# Patient Record
Sex: FEMALE | Race: WHITE | NOT HISPANIC OR LATINO | Employment: FULL TIME | URBAN - METROPOLITAN AREA
[De-identification: names, ages, dates, MRNs, and addresses within clinical notes are randomized per-mention and may not be internally consistent; named-entity substitution may affect disease eponyms.]

---

## 2017-08-08 ENCOUNTER — ALLSCRIPTS OFFICE VISIT (OUTPATIENT)
Dept: OTHER | Facility: OTHER | Age: 37
End: 2017-08-08

## 2018-01-11 NOTE — MISCELLANEOUS
Message   Recorded as Task   Date: 08/09/2016 11:19 AM, Created By: Sharif Whitley   Task Name: Follow Up   Assigned To: Sharif Whitley   Regarding Patient: Mark Hoffmann, Status: Active   Comment: Kimberlyjuventino Gutierrez - 09 Aug 2016 11:19 AM     TASK CREATED  please call norah and let her know her cholesterol was normal but her sugar was still borderline  Her A1C was 5 7% I would just have her watch her diet, sugars, carbs and we will repeat that test in 6 months  Then please fax her physical sheet to the number listed and ask if patient wants the original mailed to her  Thanks  Heena Hinson - 09 Aug 2016 11:58 AM     TASK REPLIED TO: Previously Assigned To Heena Hinson                      pt aware and form faxed        Plan  Encounter for routine gynecological examination    · (Q) THINPREP PAP RFX HR HPV ; every 2 years; Last 17Jok1590; Next 36Ncs7712;  Status:Active  Prediabetes    · (Q) HEMOGLOBIN A1c WITH eAG; Status:Active; Requested ZOF:[id number];     Signatures   Electronically signed by : Nata Grant, [facility];  Aug  9 2016  1:04PM EST                       (Author)

## 2018-01-15 NOTE — PROGRESS NOTES
Assessment    1  Current every day smoker (305 1) (F17 200)   2  Encounter for preventive health examination (V70 0) (Z00 00)    Discussion/Summary  health maintenance visit healthy adult female Currently, she eats a healthy diet and has an adequate exercise regimen  The risks and benefits of immunizations were discussed and immunizations are up to date  Patient discussion: discussed with the patient  Physical - conducted, labs ordered per work will have copied here, quit smoking  PAP due 8/2019    f/u as needed  f/u 1 year  Chief Complaint  Pt presents to the office for her annual PE  PAP due 08/03/18      History of Present Illness  HM, Adult Female: The patient is being seen for a health maintenance evaluation  The last health maintenance visit was 1 year(s) ago  General Health: The patient's health since the last visit is described as good  She has regular dental visits  She denies vision problems  She denies hearing loss  Lifestyle:  She consumes a diverse and healthy diet  She exercises regularly  She uses tobacco  She consumes alcohol  She denies drug use  Reproductive health:  she reports normal menses  Screening:   HPI: Patient here for physical  No complaints  Review of Systems    Constitutional: No fever, no chills, feels well, no tiredness, no recent weight gain or weight loss  Eyes: No complaints of eye pain, no red eyes, no eyesight problems, no discharge, no dry eyes, no itching of eyes  ENT: no complaints of earache, no loss of hearing, no nose bleeds, no nasal discharge, no sore throat, no hoarseness  Cardiovascular: No complaints of slow heart rate, no fast heart rate, no chest pain, no palpitations, no leg claudication, no lower extremity edema  Respiratory: No complaints of shortness of breath, no wheezing, no cough, no SOB on exertion, no orthopnea, no PND     Gastrointestinal: No complaints of abdominal pain, no constipation, no nausea or vomiting, no diarrhea, no bloody stools  Genitourinary: No complaints of dysuria, no incontinence, no pelvic pain, no dysmenorrhea, no vaginal discharge or bleeding  Musculoskeletal: No complaints of arthralgias, no myalgias, no joint swelling or stiffness, no limb pain or swelling  Integumentary: No complaints of skin rash or lesions, no itching, no skin wounds, no breast pain or lump  Neurological: No complaints of headache, no confusion, no convulsions, no numbness, no dizziness or fainting, no tingling, no limb weakness, no difficulty walking  Psychiatric: Not suicidal, no sleep disturbance, no anxiety or depression, no change in personality, no emotional problems  Endocrine: No complaints of proptosis, no hot flashes, no muscle weakness, no deepening of the voice, no feelings of weakness  Hematologic/Lymphatic: No complaints of swollen glands, no swollen glands in the neck, does not bleed easily, does not bruise easily  Active Problems    1  Allergic rhinitis due to pollen (477 0) (J30 1)   2  Current every day smoker (305 1) (F17 200)   3  Encounter for routine gynecological examination (V72 31) (Z01 419)   4  Laboratory examination ordered as part of a routine general medical examination   (V72 62) (Z00 00)   5   Prediabetes (790 29) (R73 09)    Past Medical History    · History of Acute sinusitis (461 9) (J01 90)   · History of Cough (786 2) (R05)   · History of Cough (786 2) (R05)   · History of acute bronchitis (V12 69) (Z87 09)   · History of dysfunctional uterine bleeding (V13 29) (Z87 42)   · History of menorrhagia (V13 29) (Z87 42)   · History of sinusitis (V12 69) (Z87 09)   · History of Nulliparity (V61 8)   · History of Viral upper respiratory infection (465 9) (J06 9,B97 89)    Surgical History    · Denied: History Of Prior Surgery    Family History  Mother    · Denied: Family history of mental disorder   · Denied: Family history of substance abuse   · Family history of Living and Healthy  Father    · Family history of Diabetes Mellitus (V18 0)   · Denied: Family history of mental disorder   · Denied: Family history of substance abuse  Paternal Grandmother    · Family history of Colon cancer  Paternal Grandfather    · Family history of Diabetes Mellitus (V18 0)  Maternal Aunt    · Family history of Breast cancer  Paternal Uncle    · Family history of Diabetes Mellitus (V18 0)    Social History    · Alcohol Use (History)   · 2 beers per wk   · Always uses seat belt   · Caffeine Use   · 1 cup of coffee or tea q other day   · Current every day smoker (305 1) (F17 200)   · Current Smoker (305 1)   · 5-8 cigarettes per day   · Current some day smoker (305 1) (F17 200)   · Smokes 1/2pk of cigarettes a day  · Denied: History of Drug Use   · no   · Has carbon monoxide detectors in home   · Has smoke detectors    Current Meds   1  Fluticasone Propionate 50 MCG/ACT Nasal Suspension; use 2 sprays in each nostril   once daily; Therapy: 43KAC1378 to (Griselda Beady)  Requested for: 67JYG2154; Last   Rx:30Oct2015 Ordered   2  Mibelas 24 Fe 1-20 MG-MCG(24) Oral Tablet Chewable; CHEW TABLET Daily; Therapy: (Recorded:08Aug2017) to Recorded   3  Multiple Vitamins Oral Tablet; Take 1 daily; Therapy: 55DIL0851 to Recorded    Allergies    1  No Known Drug Allergies    2  No Known Environmental Allergies   3  No Known Food Allergies    Vitals   Recorded: 08Aug2017 02:09PM   Heart Rate 84, R Radial   Pulse Quality Normal, R Radial   Respiration Quality Normal   Respiration 14   Systolic 965, RUE, Sitting   Diastolic 68, RUE, Sitting   Height 5 ft 2 5 in   Weight 163 lb 6 4 oz   BMI Calculated 29 41   BSA Calculated 1 76     Physical Exam    Constitutional   General appearance: No acute distress, well appearing and well nourished  Head and Face   Head and face: Normal     Eyes   Conjunctiva and lids: No swelling, erythema or discharge  Pupils and irises: Equal, round, reactive to light      Ears, Nose, Mouth, and Throat External inspection of ears and nose: Normal     Otoscopic examination: Tympanic membranes translucent with normal light reflex  Canals patent without erythema  Hearing: Normal     Nasal mucosa, septum, and turbinates: Normal without edema or erythema  Lips, teeth, and gums: Normal, good dentition  Oropharynx: Normal with no erythema, edema, exudate or lesions  Neck   Neck: Supple, symmetric, trachea midline, no masses  Thyroid: Normal, no thyromegaly  Pulmonary   Respiratory effort: No increased work of breathing or signs of respiratory distress  Palpation of chest: Normal     Auscultation of lungs: Clear to auscultation  Cardiovascular   Palpation of heart: Normal PMI, no thrills  Auscultation of heart: Normal rate and rhythm, normal S1 and S2, no murmurs  Pedal pulses: 2+ bilaterally  Examination of extremities for edema and/or varicosities: Normal     Abdomen   Abdomen: Non-tender, no masses  Liver and spleen: No hepatomegaly or splenomegaly  Lymphatic   Palpation of lymph nodes in neck: No lymphadenopathy  Musculoskeletal   Gait and station: Normal     Digits and nails: Normal without clubbing or cyanosis  Joints, bones, and muscles: Normal     Range of motion: Normal     Stability: Normal     Muscle strength/tone: Normal     Skin   Skin and subcutaneous tissue: Normal without rashes or lesions  Palpation of skin and subcutaneous tissue: Normal turgor  Neurologic   Cranial nerves: Cranial nerves II-XII intact  Reflexes: 2+ and symmetric  Psychiatric   Judgment and insight: Normal     Mood and affect: Normal        Results/Data  PHQ-2 Adult Depression Screening 03Gkg0201 04:24PM User, s     Test Name Result Flag Reference   PHQ-2 Adult Depression Score 0     Over the last two weeks, how often have you been bothered by any of the following problems?   Little interest or pleasure in doing things: Not at all - 0  Feeling down, depressed, or hopeless: Not at all - 0   PHQ-2 Adult Depression Screening Negative         Health Management  Encounter for routine gynecological examination   (every) THINPREP PAP RFX HR HPV; every 2 years; Last 84Bjj9925; Next Due:  51Bvp5734; Overdue    Signatures   Electronically signed by : MK Alvarez;  Aug  8 2017  4:20PM EST                       (Author)    Electronically signed by : CHELITA Mayberry ; Aug  8 2017  4:38PM EST                       (Author)

## 2018-01-15 NOTE — PROGRESS NOTES
Assessment    1  Current some day smoker (305 1) (F17 210)   · Smokes 1/2pk of cigarettes a day  2  Encounter for routine gynecological examination (V72 31) (Z01 419)   3  Prediabetes (790 29) (R73 09)    Plan  Encounter for routine gynecological examination    · (Q) THINPREP TIS PAP RFX HPV; Status:Active; Requested VTX:[id number];   PAP: : 08/12/2015  : 07/11/2016  Laboratory examination ordered as part of a routine general medical examination    · (1) GLUCOSE,  FASTING; Status:Active; Requested for:62Shc9404;    · (Q) LIPID PANEL WITH REFLEX TO DIRECT LDL; Status:Active; Requested  ESK:75IRP6570;   Laboratory examination ordered as part of a routine general medical examination,  Prediabetes    · (Q) HEMOGLOBIN A1C WITH MPG; Status:Active; Requested for:46Ybk0369;   PMH: History of menorrhagia    · Minastrin 24 Fe 1-20 MG-MCG(24) Oral Tablet Chewable; CHEW 1 TABLET  DAILY    Discussion/Summary  health maintenance visit healthy adult female Currently, she eats an adequate diet and has an adequate exercise regimen  the risks and benefits of cervical cancer screening were discussed Pap test with reflex HPV testing was done today Breast cancer screening: the risks and benefits of breast cancer screening were discussed, self breast exam technique was taught and monthly self breast exam was advised  The risks and benefits of immunizations were discussed and immunizations are up to date  Patient discussion: discussed with the patient  Gyn exam - PAP done today, SBE monthly advised, renewed OCP  lab slip given for labs per insurance  prediabtes - recheck A1C today    f/u pending A1C  f/u 1 year for physical, no PAP  f/u as needed  Chief Complaint  Pt presents to the office for her PAP and GYN exam  Last 08/2015      History of Present Illness  HM, Adult Female: The patient is being seen for a gynecology evaluation  The last health maintenance visit was 1 year(s) ago  General Health:  The patient's health since the last visit is described as good  She has regular dental visits  Lifestyle:  She consumes a diverse and healthy diet  She exercises regularly  She uses tobacco  She consumes alcohol  She denies drug use  Reproductive health:  she reports normal menses  Menstrual history: LMP: the last menstrual period was 07/11/2016  Recent menstrual periods: bleeding has been normal  The cycles have been regular  she uses contraception  she is sexually active  she denies prior pregnancies  Screening:   HPI: Patient here for annual gyn exam  No complaints  Review of Systems    Constitutional: No fever, no chills, feels well, no tiredness, no recent weight gain or weight loss  Eyes: No complaints of eye pain, no red eyes, no eyesight problems, no discharge, no dry eyes, no itching of eyes  ENT: no complaints of earache, no loss of hearing, no nose bleeds, no nasal discharge, no sore throat, no hoarseness  Cardiovascular: No complaints of slow heart rate, no fast heart rate, no chest pain, no palpitations, no leg claudication, no lower extremity edema  Respiratory: No complaints of shortness of breath, no wheezing, no cough, no SOB on exertion, no orthopnea, no PND  Gastrointestinal: No complaints of abdominal pain, no constipation, no nausea or vomiting, no diarrhea, no bloody stools  Genitourinary: No complaints of dysuria, no incontinence, no pelvic pain, no dysmenorrhea, no vaginal discharge or bleeding  Musculoskeletal: No complaints of arthralgias, no myalgias, no joint swelling or stiffness, no limb pain or swelling  Integumentary: No complaints of skin rash or lesions, no itching, no skin wounds, no breast pain or lump  Neurological: No complaints of headache, no confusion, no convulsions, no numbness, no dizziness or fainting, no tingling, no limb weakness, no difficulty walking     Psychiatric: Not suicidal, no sleep disturbance, no anxiety or depression, no change in personality, no emotional problems  Endocrine: No complaints of proptosis, no hot flashes, no muscle weakness, no deepening of the voice, no feelings of weakness  Hematologic/Lymphatic: No complaints of swollen glands, no swollen glands in the neck, does not bleed easily, does not bruise easily  Active Problems    1  Allergic rhinitis due to pollen (477 0) (J30 1)   2  Current every day smoker (305 1) (F17 200)   3  Encounter for routine gynecological examination (V72 31) (Z01 419)   4  Laboratory examination ordered as part of a routine general medical examination   (V72 62) (Z00 00)   5  Prediabetes (790 29) (R73 09)    Past Medical History    · History of Acute sinusitis (461 9) (J01 90)   · History of Cough (786 2) (R05)   · History of Cough (786 2) (R05)   · History of acute bronchitis (V12 69) (Z87 09)   · History of dysfunctional uterine bleeding (V13 29) (Z87 42)   · History of menorrhagia (V13 29) (Z87 42)   · History of sinusitis (V12 69) (Z87 09)   · History of Nulliparity (V61 8)   · History of Viral upper respiratory infection (465 9) (J06 9,B97 89)    Surgical History    · Denied: History Of Prior Surgery    Family History  Mother    · Family history of Living and Healthy  Father    · Family history of Diabetes Mellitus (V18 0)  Paternal Grandmother    · Family history of Colon cancer  Paternal Grandfather    · Family history of Diabetes Mellitus (V18 0)  Maternal Aunt    · Family history of Breast cancer  Paternal Uncle    · Family history of Diabetes Mellitus (V18 0)    Social History    · Alcohol Use (History)   · 2 beers per wk   · Always uses seat belt   · Caffeine Use   · 1 cup of coffee or tea q other day   · Current every day smoker (305 1) (F17 200)   · Current Smoker (305 1)   · 5-8 cigarettes per day   · Current some day smoker (305 1) (F17 210)   · Smokes 1/2pk of cigarettes a day     · Denied: History of Drug Use   · no   · Has carbon monoxide detectors in home   · Has smoke detectors    Current Meds   1  Fluticasone Propionate 50 MCG/ACT Nasal Suspension; use 2 sprays in each nostril   once daily; Therapy: 47EOY3910 to (Carina Kerns)  Requested for: 11STR5653; Last   Rx:30Oct2015 Ordered   2  Minastrin 24 Fe 1-20 MG-MCG(24) Oral Tablet Chewable; Therapy: 24CRA4469 to Recorded   3  Multiple Vitamins Oral Tablet; Take 1 daily; Therapy: 36RMX0879 to Recorded    Allergies    1  No Known Drug Allergies    2  No Known Environmental Allergies   3  No Known Food Allergies    Vitals   Recorded: 71TIX3241 95:19CL   Systolic 444, RUE, Sitting   Diastolic 72, RUE, Sitting   Heart Rate 84, R Radial   Pulse Quality Normal, R Radial   Respiration Quality Normal   Respiration 14   Height 5 ft 2 5 in   Weight 192 lb 11 2 oz   BMI Calculated 34 68   BSA Calculated 1 89     Physical Exam    Constitutional   General appearance: No acute distress, well appearing and well nourished  Neck   Neck: Supple, symmetric, trachea midline, no masses  Thyroid: Normal, no thyromegaly  Pulmonary   Respiratory effort: No increased work of breathing or signs of respiratory distress  Palpation of chest: Normal     Auscultation of lungs: Clear to auscultation  Cardiovascular   Palpation of heart: Normal PMI, no thrills  Auscultation of heart: Normal rate and rhythm, normal S1 and S2, no murmurs  Carotid pulses: 2+ bilaterally  Pedal pulses: 2+ bilaterally  Examination of extremities for edema and/or varicosities: Normal     Chest   Breasts: Normal, no dimpling or skin changes appreciated  Palpation of breasts and axillae: Normal, no masses palpated  Chest: Normal     Abdomen   Abdomen: Non-tender, no masses  Liver and spleen: No hepatomegaly or splenomegaly  Genitourinary   External genitalia and vagina: Normal, no lesions appreciated  Cervix: Normal, no lesions, Pap obtained  Uterus: Normal size, no tenderness, no masses  Adnexa/Parametria: Normal, no masses or tenderness  Lymphatic   Palpation of lymph nodes in neck: No lymphadenopathy  Palpation of lymph nodes in axillae: No lymphadenopathy  Palpation of lymph nodes in groin: No lymphadenopathy  Musculoskeletal   Gait and station: Normal     Skin   Skin and subcutaneous tissue: Normal without rashes or lesions  Palpation of skin and subcutaneous tissue: Normal turgor  Neurologic   Cranial nerves: Cranial nerves II-XII intact  Reflexes: 2+ and symmetric  Psychiatric   Judgment and insight: Normal     Mood and affect: Normal        Health Management  Encounter for routine gynecological examination   (every) THINPREP PAP RFX HR HPV; every 2 years; Last 65Goq2549; Next Due:  14Jxu5758; Overdue    Signatures   Electronically signed by : MK Marroquin;  Aug  3 2016  4:20PM EST                       (Author)    Electronically signed by : CHELITA Hu ; Aug  3 2016  4:41PM EST                       (Author)

## 2018-01-18 ENCOUNTER — ALLSCRIPTS OFFICE VISIT (OUTPATIENT)
Dept: OTHER | Facility: OTHER | Age: 38
End: 2018-01-18

## 2018-01-20 NOTE — PROGRESS NOTES
Assessment   1  Acute sinusitis (461 9) (J01 90)    Plan   Acute sinusitis    · Amoxicillin 875 MG Oral Tablet; TAKE 1 TABLET EVERY 12 HOURS DAILY  Cough    · PEAK FLOW- POC; Status:Resulted - Requires Verification,Retrospective By Protocol    Authorization;   Done: 84ZYI2579 01:38PM    Discussion/Summary      1  sinusitis - start amoxil 1 tab twice daily for 10 days, continue with Flonase, fluids, rest   - due 8/2018   as needed  Possible side effects of new medications were reviewed with the patient/guardian today  The treatment plan was reviewed with the patient/guardian  The patient/guardian understands and agrees with the treatment plan      Chief Complaint   Pt presents to the office with c/o an URI; due 08/03/2018      History of Present Illness   HPI: Patients here c/o fighting a cold for 2 weeks then Friday started to get worse, by this morning felt the worst so made an appt  Runny nose, scratchy throat, coughing, tickle in throat, coughing up green mucus, green mucus from nose  Ears clogged won't pop, sore throat  Using Flonase with no relief  Active Problems   1  Allergic rhinitis due to pollen (477 0) (J30 1)   2  Current every day smoker (305 1) (F17 200)   3  Encounter for routine gynecological examination (V72 31) (Z01 419)   4  Laboratory examination ordered as part of a routine general medical examination     (V72 62) (Z00 00)   5  Prediabetes (790 29) (R73 09)    Past Medical History   1  History of Acute sinusitis (461 9) (J01 90)   2  History of Cough (786 2) (R05)   3  History of Cough (786 2) (R05)   4  History of acute bronchitis (V12 69) (Z87 09)   5  History of dysfunctional uterine bleeding (V13 29) (Z87 42)   6  History of menorrhagia (V13 29) (Z87 42)   7  History of sinusitis (V12 69) (Z87 09)   8  History of Nulliparity (V61 8)   9  History of Viral upper respiratory infection (465 9) (J06 9,B97 89)  Active Problems And Past Medical History Reviewed:     The active problems and past medical history were reviewed and updated today  Family History   Mother    1  Denied: Family history of mental disorder   2  Denied: Family history of substance abuse   3  Family history of Living and Healthy  Father    4  Family history of Diabetes Mellitus (V18 0)   5  Denied: Family history of mental disorder   6  Denied: Family history of substance abuse  Paternal Grandmother    9  Family history of Colon cancer  Paternal Grandfather    6  Family history of Diabetes Mellitus (V18 0)  Maternal Aunt    9  Family history of Breast cancer  Paternal Uncle    8  Family history of Diabetes Mellitus (V18 0)  Family History Reviewed: The family history was reviewed and updated today  Social History    · Alcohol Use (History)   · 2 beers per wk   · Always uses seat belt   · Caffeine Use   · 1 cup of coffee or tea q other day   · Current every day smoker (305 1) (F17 200)   · Current Smoker (305 1)   · 5-8 cigarettes per day   · Current some day smoker (305 1) (F17 200)   · Smokes 1/2pk of cigarettes a day  · Denied: History of Drug Use   · no   · Has carbon monoxide detectors in home   · Has smoke detectors  The social history was reviewed and updated today  The social history was reviewed and is unchanged  Surgical History   1  Denied: History Of Prior Surgery  Surgical History Reviewed: The surgical history was reviewed and updated today  Current Meds    1  Fluticasone Propionate 50 MCG/ACT Nasal Suspension; use 2 sprays in each nostril     once daily; Therapy: 68HAQ6054 to (Isra Mathew)  Requested for: 82KRX0434; Last     Rx:28Atw0057 Ordered   2  Mibelas 24 Fe 1-20 MG-MCG(24) Oral Tablet Chewable; CHEW TABLET Daily; Therapy: (Recorded:84Img6601) to Recorded   3  Multiple Vitamins Oral Tablet; Take 1 daily; Therapy: 42GKV7053 to Recorded     The medication list was reviewed and updated today  Allergies   1  No Known Drug Allergies  2   No Known Environmental Allergies   3  No Known Food Allergies    Vitals    Recorded: 66MGF3924 01:33PM   Temperature 99 1 F, Oral   Heart Rate 76, L Radial   Pulse Quality Normal, L Radial   Respiration Quality Normal   Respiration 16   Systolic 241, LUE, Sitting   Diastolic 68, LUE, Sitting   Height 5 ft 2 5 in   Weight 174 lb 3 2 oz   BMI Calculated 31 35   BSA Calculated 1 81     Physical Exam        Constitutional      General appearance: No acute distress, well appearing and well nourished  Eyes      Conjunctiva and lids: No swelling, erythema or discharge  Pupils and irises: Equal, round and reactive to light  Ears, Nose, Mouth, and Throat      External inspection of ears and nose: Normal        Otoscopic examination: Abnormal  -- TM dull L, R TM normal       Nasal mucosa, septum, and turbinates: Abnormal  -- turbanites inflamed, congested yellow mucus  Oropharynx: Abnormal  -- pharynx with minimal erythema  Pulmonary      Respiratory effort: No increased work of breathing or signs of respiratory distress  Auscultation of lungs: Clear to auscultation  Cardiovascular      Palpation of heart: Normal PMI, no thrills  Auscultation of heart: Normal rate and rhythm, normal S1 and S2, without murmurs  Lymphatic      Palpation of lymph nodes in neck: No lymphadenopathy         Psychiatric      Orientation to person, place, and time: Normal        Mood and affect: Normal           Results/Data   PEAK FLOW- POC 27INV7013 01:38PM Chantell Corrigan      Test Name Result Flag Reference   Peak Flow 400/425/400                      Signatures    Electronically signed by : Dimitri Lomeli, [facility]; Jan 18 2018  2:03PM EST                       (Author)     Electronically signed by : Deloris Maldonado DO; Jan 19 2018 10:54AM EST

## 2018-01-22 VITALS
RESPIRATION RATE: 14 BRPM | HEIGHT: 63 IN | HEART RATE: 84 BPM | SYSTOLIC BLOOD PRESSURE: 112 MMHG | BODY MASS INDEX: 28.95 KG/M2 | WEIGHT: 163.4 LBS | DIASTOLIC BLOOD PRESSURE: 68 MMHG

## 2018-01-23 VITALS
RESPIRATION RATE: 16 BRPM | WEIGHT: 174.2 LBS | HEIGHT: 63 IN | HEART RATE: 76 BPM | BODY MASS INDEX: 30.87 KG/M2 | TEMPERATURE: 99.1 F | SYSTOLIC BLOOD PRESSURE: 122 MMHG | DIASTOLIC BLOOD PRESSURE: 68 MMHG

## 2018-01-23 NOTE — MISCELLANEOUS
Message  Return to work or school:   Love Alvarez is under my professional care   She was seen in my office on 01-   She is able to return to work on  01-            [facility] RUDY    Electronically signed by : Celio Hassan, ; Jan 18 2018  2:03PM EST                       (Author)

## 2018-02-01 ENCOUNTER — TELEPHONE (OUTPATIENT)
Dept: FAMILY MEDICINE CLINIC | Facility: CLINIC | Age: 38
End: 2018-02-01

## 2018-02-01 NOTE — TELEPHONE ENCOUNTER
Patient was seen about two weeks ago with a sinus infection  She finished the antibiotic she was prescribed but it still not feeling any better  She wanted to know if another round of antibiotic could be prescribed

## 2018-02-02 ENCOUNTER — TELEPHONE (OUTPATIENT)
Dept: FAMILY MEDICINE CLINIC | Facility: CLINIC | Age: 38
End: 2018-02-02

## 2018-02-02 DIAGNOSIS — J01.01 ACUTE RECURRENT MAXILLARY SINUSITIS: Primary | ICD-10-CM

## 2018-02-02 RX ORDER — CEFUROXIME AXETIL 500 MG/1
500 TABLET ORAL EVERY 12 HOURS SCHEDULED
Qty: 28 TABLET | Refills: 0 | Status: SHIPPED | OUTPATIENT
Start: 2018-02-02 | End: 2018-02-16

## 2018-02-02 NOTE — TELEPHONE ENCOUNTER
The pharmacy has it! Sorry for the confusion  I should have gone into "encounters" in patient's chart! Rosa Soliman

## 2018-02-02 NOTE — TELEPHONE ENCOUNTER
Patient was in to see you and you said you were going to prescribe an antibiotic for her symptoms  She called her Pharmacy and nothing has been sent  Did you send it? She thought it was Amoxicillin but wasn't sure

## 2018-02-02 NOTE — TELEPHONE ENCOUNTER
I thought I sent it yesteday like my note said  I sent it again, maybe call the pharmacy to confirm I did it the right way please  Thanks

## 2018-03-29 ENCOUNTER — TELEPHONE (OUTPATIENT)
Dept: FAMILY MEDICINE CLINIC | Facility: CLINIC | Age: 38
End: 2018-03-29

## 2018-03-29 NOTE — TELEPHONE ENCOUNTER
Patient is asking for a refill on MINERA  It has to BE BRAND NECESSARY ONLY  She is very frustrated because [facility] week and they have not called her back, I explained it is our not being able to receive faxes in a timely manor  Patient does know you are out of the office until Tuesday    Please send refill to [address] [place] [address]      [phone]

## 2018-04-01 RX ORDER — NORETHINDRONE ACETATE AND ETHINYL ESTRADIOL AND FERROUS FUMARATE 1MG-20(24)
KIT ORAL DAILY
COMMUNITY
End: 2018-04-13 | Stop reason: CLARIF

## 2018-04-01 RX ORDER — NORETHINDRONE ACETATE AND ETHINYL ESTRADIOL AND FERROUS FUMARATE 1MG-20(24)
1 KIT ORAL DAILY
Refills: 11 | COMMUNITY
Start: 2018-02-08 | End: 2018-04-13 | Stop reason: SDUPTHER

## 2018-04-01 NOTE — TELEPHONE ENCOUNTER
Are you sure it is Minera? I have melodetta and chely not Godfrey Gill, look at her med list I added the two  Thanks

## 2018-04-02 NOTE — TELEPHONE ENCOUNTER
Patient thought it was Leobardo Chua, however after talking to her again she did remember the 2 drugs you mentioned  She is 2 weeks into the [facility] and is not having any issues  However she does really like the [facility], would like to go to that again   She said the insurance company just changes it on her

## 2018-04-12 ENCOUNTER — TELEPHONE (OUTPATIENT)
Dept: FAMILY MEDICINE CLINIC | Facility: CLINIC | Age: 38
End: 2018-04-12

## 2018-04-12 NOTE — TELEPHONE ENCOUNTER
Pt states the only birth control that will be covered from her is melodetta  She [facility]

## 2018-04-12 NOTE — TELEPHONE ENCOUNTER
We established what is covered by ins, however it was never ordered   Please order [facility]

## 2018-04-13 DIAGNOSIS — Z78.9 USES BIRTH CONTROL: Primary | ICD-10-CM

## 2018-04-13 RX ORDER — NORETHINDRONE ACETATE AND ETHINYL ESTRADIOL AND FERROUS FUMARATE 1MG-20(24)
1 KIT ORAL DAILY
Qty: 84 TABLET | Refills: 3 | Status: SHIPPED | OUTPATIENT
Start: 2018-04-13 | End: 2019-03-13 | Stop reason: SDUPTHER

## 2018-08-08 RX ORDER — IBUPROFEN 600 MG/1
600 TABLET ORAL EVERY 6 HOURS
COMMUNITY
Start: 2016-07-04 | End: 2020-03-30 | Stop reason: SDUPTHER

## 2018-08-09 ENCOUNTER — OFFICE VISIT (OUTPATIENT)
Dept: FAMILY MEDICINE CLINIC | Facility: CLINIC | Age: 38
End: 2018-08-09
Payer: COMMERCIAL

## 2018-08-09 VITALS
BODY MASS INDEX: 31.93 KG/M2 | HEIGHT: 63 IN | HEART RATE: 75 BPM | DIASTOLIC BLOOD PRESSURE: 80 MMHG | WEIGHT: 180.2 LBS | SYSTOLIC BLOOD PRESSURE: 125 MMHG | RESPIRATION RATE: 15 BRPM

## 2018-08-09 DIAGNOSIS — Z13.220 LIPID SCREENING: ICD-10-CM

## 2018-08-09 DIAGNOSIS — Z00.00 ROUTINE ADULT HEALTH MAINTENANCE: Primary | ICD-10-CM

## 2018-08-09 DIAGNOSIS — Z13.1 DIABETES MELLITUS SCREENING: ICD-10-CM

## 2018-08-09 PROCEDURE — 99395 PREV VISIT EST AGE 18-39: CPT | Performed by: PHYSICIAN ASSISTANT

## 2018-08-09 NOTE — PROGRESS NOTES
Subjective     Well Adult Physical     Do Agee is a 40 y o   female and is here for routine health maintenance  The patient reports no problems  Patient here for physical  Lost dog 6 months ago, struggling but doing well with the support of boyfriend and family  Still smoking 1/2 ppd for 20 years, not interested in quitting now, but will in future  Diet and Physical Activity  Diet: well balanced diet  Weight concerns: Patient has class 1 obesity (BMI 30-34  9)  Exercise: frequently      Depression Screen  PHQ-9 Depression Screening    PHQ-9:    Frequency of the following problems over the past two weeks:       Little interest or pleasure in doing things:  0 - not at all  Feeling down, depressed, or hopeless:  0 - not at all  PHQ-2 Score:  0          General Health  Hearing: Normal:  bilateral  Vision: no vision problems  Dental: regular dental visits, brushes teeth twice daily and flosses teeth occasionally     History:  LMP: 8/1/2018    Cancer Screening  Colononoscopy not indicated  Mammogram not indicated   Pap 8/2016 due 2019  Abnormal pap? no  Smoker yes 1/2 ppd for 20 years Annual screening with low-dose helical computed tomography (CT) for patients age 54 to 76 years with history of smoking at least 30 pack-years and, if a former smoker, had quit within the previous 15 years      The following portions of the patient's history were reviewed and updated as appropriate: allergies, current medications, past family history, past medical history, past social history, past surgical history and problem list     Review of Systems     Review of Systems   Constitutional: Negative  HENT: Negative  Eyes: Negative  Respiratory: Negative  Cardiovascular: Negative  Gastrointestinal: Negative  Endocrine: Negative  Genitourinary: Negative  Musculoskeletal: Negative  Skin: Negative  Allergic/Immunologic: Negative  Neurological: Negative  Hematological: Negative  Psychiatric/Behavioral: Negative  Past Medical History     History reviewed  No pertinent past medical history  Past Surgical History     History reviewed  No pertinent surgical history  Social History     Social History     Social History    Marital status: Single     Spouse name: N/A    Number of children: N/A    Years of education: N/A     Social History Main Topics    Smoking status: Current Every Day Smoker    Smokeless tobacco: Never Used      Comment: last assessed 8/8/17    Alcohol use Yes      Comment: 2 beers a week    Drug use: No    Sexual activity: Not Asked     Other Topics Concern    None     Social History Narrative    None       Family History     Family History   Problem Relation Age of Onset    No Known Problems Mother     Diabetes Father     Breast cancer Paternal Grandmother     Thyroid disease Paternal Grandfather     Breast cancer Maternal Aunt     Thyroid disease Paternal Uncle     Mental illness Neg Hx         mother father    Substance Abuse Neg Hx         mother father    Alcohol abuse Neg Hx        Current Medications       Current Outpatient Prescriptions:     ibuprofen (MOTRIN) 600 mg tablet, Take 600 mg by mouth every 6 (six) hours, Disp: , Rfl:     MELODETTA 24 FE 1-20 MG-MCG(24) CHEW, Chew 1 tablet daily, Disp: 84 tablet, Rfl: 3     Allergies     No Known Allergies    Objective     /80 (BP Location: Left arm, Patient Position: Sitting, Cuff Size: Standard)   Pulse 75   Resp 15   Ht 5' 2 5" (1 588 m)   Wt 81 7 kg (180 lb 3 2 oz)   Breastfeeding? No   BMI 32 43 kg/m²      Physical Exam   Constitutional: She is oriented to person, place, and time  She appears well-developed and well-nourished  HENT:   Head: Normocephalic and atraumatic     Right Ear: External ear normal    Left Ear: External ear normal    Nose: Nose normal    Mouth/Throat: Oropharynx is clear and moist    Eyes: Conjunctivae and EOM are normal  Pupils are equal, round, and reactive to light  Neck: Normal range of motion  Neck supple  No thyromegaly present  Cardiovascular: Normal rate, regular rhythm, normal heart sounds and intact distal pulses  No murmur heard  Pulmonary/Chest: Effort normal and breath sounds normal  No respiratory distress  She has no wheezes  She has no rales  Abdominal: Soft  Bowel sounds are normal  She exhibits no distension and no mass  There is no tenderness  Musculoskeletal: Normal range of motion  She exhibits no edema  Lymphadenopathy:     She has no cervical adenopathy  Neurological: She is alert and oriented to person, place, and time  She has normal reflexes  No cranial nerve deficit  Skin: Skin is warm and dry  Psychiatric: She has a normal mood and affect  Judgment normal          No exam data present    Health Maintenance     Health Maintenance   Topic Date Due    HIV SCREENING  1981    PNEUMOCOCCAL POLYSACCHARIDE VACCINE AGE 2-64 HIGH RISK  09/21/1983    PAP SMEAR  07/02/2018    INFLUENZA VACCINE  09/01/2018    Depression Screening PHQ-9  08/09/2019    DTaP,Tdap,and Td Vaccines (2 - Td) 01/25/2022     Immunization History   Administered Date(s) Administered    Tdap 01/25/2012       Assessment/Plan     Healthy female exam     1  Healthy 40 yr old female  2  Patient Counseling:   · Nutrition: Stressed importance of a well balanced diet, moderation of sodium/saturated fat, caloric balance and sufficient intake of fiber  · Exercise: Stressed the importance of regular exercise with a goal of 150 minutes per week  · Dental Health: Discussed daily flossing and brushing and regular dental visits   · Sexuality: Discussed sexually transmitted infections, use of condoms and prevention of unintended pregnancy  · Alcohol Use:  Recommended moderation of alcohol intake  · Injury Prevention: Discussed Safety Belts, Safety Helmets, and Smoke Detectors    · Immunizations reviewed  Up to date  · Discussed the patient's BMI with her  The BMI is above average; no BMI management plan is appropriate  3  Labs lipids, glucose  4  Follow up in one year   With ALONDRA Weiss PA-C

## 2018-08-28 ENCOUNTER — TELEPHONE (OUTPATIENT)
Dept: FAMILY MEDICINE CLINIC | Facility: CLINIC | Age: 38
End: 2018-08-28

## 2018-08-28 NOTE — TELEPHONE ENCOUNTER
OK!  I won't be in the office tomorrow so can you please keep patient informed because this is her wellness stuff for her job

## 2018-08-28 NOTE — TELEPHONE ENCOUNTER
[facility] called to see if Jessica Liao had faxed over her completed form for [employer]  I checked with Jessica Liao and she never received the bloodwork results from [employer]   She asked if you would please retrieve her lab results and give them to her        When form is completed, it needs to be faxed to [address] [place] [address] at [phone] and [facility] wants a call to know it has been finished [phone]

## 2019-03-13 ENCOUNTER — OFFICE VISIT (OUTPATIENT)
Dept: FAMILY MEDICINE CLINIC | Facility: CLINIC | Age: 39
End: 2019-03-13
Payer: COMMERCIAL

## 2019-03-13 VITALS
WEIGHT: 191.7 LBS | HEART RATE: 76 BPM | DIASTOLIC BLOOD PRESSURE: 78 MMHG | HEIGHT: 63 IN | RESPIRATION RATE: 18 BRPM | SYSTOLIC BLOOD PRESSURE: 122 MMHG | BODY MASS INDEX: 33.96 KG/M2

## 2019-03-13 DIAGNOSIS — Z78.9 USES BIRTH CONTROL: ICD-10-CM

## 2019-03-13 DIAGNOSIS — L30.9 DERMATITIS: Primary | ICD-10-CM

## 2019-03-13 DIAGNOSIS — Z13.1 SCREENING FOR DIABETES MELLITUS: ICD-10-CM

## 2019-03-13 DIAGNOSIS — Z13.220 SCREENING, LIPID: ICD-10-CM

## 2019-03-13 PROCEDURE — 3008F BODY MASS INDEX DOCD: CPT | Performed by: PHYSICIAN ASSISTANT

## 2019-03-13 PROCEDURE — 99213 OFFICE O/P EST LOW 20 MIN: CPT | Performed by: PHYSICIAN ASSISTANT

## 2019-03-13 RX ORDER — CLOBETASOL PROPIONATE 0.5 MG/G
CREAM TOPICAL 2 TIMES DAILY
Qty: 30 G | Refills: 0 | Status: SHIPPED | OUTPATIENT
Start: 2019-03-13 | End: 2019-03-13 | Stop reason: SDUPTHER

## 2019-03-13 RX ORDER — IBUPROFEN 600 MG/1
600 TABLET ORAL EVERY 6 HOURS PRN
COMMUNITY
Start: 2016-07-04

## 2019-03-13 RX ORDER — NORETHINDRONE ACETATE AND ETHINYL ESTRADIOL AND FERROUS FUMARATE 1MG-20(24)
1 KIT ORAL DAILY
Qty: 84 TABLET | Refills: 3 | Status: SHIPPED | OUTPATIENT
Start: 2019-03-13 | End: 2020-03-04 | Stop reason: SDUPTHER

## 2019-03-13 RX ORDER — NORETHINDRONE ACETATE AND ETHINYL ESTRADIOL AND FERROUS FUMARATE 1MG-20(24)
1 KIT ORAL DAILY
Qty: 84 TABLET | Refills: 3 | Status: SHIPPED | OUTPATIENT
Start: 2019-03-13 | End: 2019-03-13 | Stop reason: SDUPTHER

## 2019-03-13 RX ORDER — CLOBETASOL PROPIONATE 0.5 MG/G
CREAM TOPICAL 2 TIMES DAILY
Qty: 30 G | Refills: 0 | Status: SHIPPED | OUTPATIENT
Start: 2019-03-13 | End: 2020-03-30 | Stop reason: ALTCHOICE

## 2019-03-13 NOTE — PROGRESS NOTES
BMI Counseling: Body mass index is 34 5 kg/m²  The BMI is above normal  Nutrition recommendations include reducing portion sizes  Assessment/Plan:    1  Dermatitis    - psoriasis looking, try temovate twice daily for no more than two weeks, if no success consider derm for biopsy and other treatments  - clobetasol (TEMOVATE) 0 05 % cream; Apply topically 2 (two) times a day  Dispense: 30 g; Refill: 0    2  Screening for diabetes mellitus    - for physical form  - Hemoglobin A1C    3  Screening, lipid    - for physical form  - Lipid Panel with Direct LDL reflex; Future    4  Uses birth control   - due for PAP scheduled over summer  - MELODETTA 24 FE 1-20 MG-MCG(24) CHEW; Chew 1 tablet daily  Dispense: 84 tablet; Refill: 3    F/u summer for PAP  F/u as needed      Subjective:   Chief Complaint   Patient presents with    Rash     leg     Contraception     refills       Patient ID: Angela Monsalve is a 41 y o  female  Patient here with a rash on inner thigh starting over the summer that moved to calf, one spot on hand and arm  Itchy, lesions no changing just spreading  Denies stress, change in medications, detergents, clothing  Needs her physical labs completed before August     Needs refills on OCP  The following portions of the patient's history were reviewed and updated as appropriate: allergies, current medications, past family history, past medical history, past social history, past surgical history and problem list     History reviewed  No pertinent past medical history  History reviewed  No pertinent surgical history    Family History   Problem Relation Age of Onset    No Known Problems Mother     Diabetes Father     Breast cancer Paternal Grandmother     Thyroid disease Paternal Grandfather     Breast cancer Maternal Aunt     Thyroid disease Paternal Uncle     Mental illness Neg Hx         mother father    Substance Abuse Neg Hx         mother father    Alcohol abuse Neg Hx      Social History     Socioeconomic History    Marital status: Single     Spouse name: Not on file    Number of children: Not on file    Years of education: Not on file    Highest education level: Not on file   Occupational History    Not on file   Social Needs    Financial resource strain: Not on file    Food insecurity:     Worry: Not on file     Inability: Not on file    Transportation needs:     Medical: Not on file     Non-medical: Not on file   Tobacco Use    Smoking status: Current Every Day Smoker    Smokeless tobacco: Never Used    Tobacco comment: last assessed 8/8/17   Substance and Sexual Activity    Alcohol use: Yes     Comment: 2 beers a week    Drug use: No    Sexual activity: Not on file   Lifestyle    Physical activity:     Days per week: Not on file     Minutes per session: Not on file    Stress: Not on file   Relationships    Social connections:     Talks on phone: Not on file     Gets together: Not on file     Attends Christianity service: Not on file     Active member of club or organization: Not on file     Attends meetings of clubs or organizations: Not on file     Relationship status: Not on file    Intimate partner violence:     Fear of current or ex partner: Not on file     Emotionally abused: Not on file     Physically abused: Not on file     Forced sexual activity: Not on file   Other Topics Concern    Not on file   Social History Narrative    Not on file       Current Outpatient Medications:     ibuprofen (MOTRIN) 600 mg tablet, Take 600 mg by mouth every 6 (six) hours, Disp: , Rfl:     ibuprofen (MOTRIN) 600 mg tablet, Take 600 mg by mouth every 6 (six) hours as needed, Disp: , Rfl:     MELODETTA 24 FE 1-20 MG-MCG(24) CHEW, Chew 1 tablet daily, Disp: 84 tablet, Rfl: 3    Review of Systems   Constitutional: Negative  Eyes: Negative  Respiratory: Negative  Cardiovascular: Negative  Neurological: Negative                Objective:    Vitals:    03/13/19 1425   BP: 122/78   BP Location: Right arm   Patient Position: Sitting   Cuff Size: Standard   Pulse: 76   Resp: 18   Weight: 87 kg (191 lb 11 2 oz)   Height: 5' 2 5" (1 588 m)        Physical Exam   Constitutional: She is oriented to person, place, and time  She appears well-developed and well-nourished  Cardiovascular: Normal rate, regular rhythm and normal heart sounds  Pulmonary/Chest: Effort normal and breath sounds normal    Neurological: She is alert and oriented to person, place, and time  Skin: Skin is warm  Left inner thigh and cald and left hand with lesions varying in size 3 cm x 3 cm thickened erythematous macular patches with some white plaques   Psychiatric: She has a normal mood and affect   Judgment normal

## 2019-08-05 LAB
CHOLEST SERPL-MCNC: 169 MG/DL (ref 100–199)
HBA1C MFR BLD: 5.4 % (ref 4.8–5.6)
HDLC SERPL-MCNC: 47 MG/DL
LDLC SERPL CALC-MCNC: 89 MG/DL (ref 0–99)
TRIGL SERPL-MCNC: 167 MG/DL (ref 0–149)

## 2019-08-06 ENCOUNTER — TELEPHONE (OUTPATIENT)
Dept: FAMILY MEDICINE CLINIC | Facility: CLINIC | Age: 39
End: 2019-08-06

## 2019-08-06 NOTE — TELEPHONE ENCOUNTER
----- Message from Matt Hung PA-C sent at 8/6/2019  1:25 PM EDT -----  Please let patient know her labs look great! Better than last year even! Form is in my bin to be faxed out  Left message to call back

## 2020-03-04 DIAGNOSIS — Z78.9 USES BIRTH CONTROL: ICD-10-CM

## 2020-03-04 RX ORDER — NORETHINDRONE ACETATE AND ETHINYL ESTRADIOL AND FERROUS FUMARATE 1MG-20(24)
1 KIT ORAL DAILY
Qty: 84 TABLET | Refills: 0 | Status: SHIPPED | OUTPATIENT
Start: 2020-03-04 | End: 2020-03-16 | Stop reason: SDUPTHER

## 2020-03-04 NOTE — TELEPHONE ENCOUNTER
We havent seen patient in over a year  I will send this refill but she will need an appt before more  Thank you

## 2020-03-16 ENCOUNTER — TELEPHONE (OUTPATIENT)
Dept: FAMILY MEDICINE CLINIC | Facility: CLINIC | Age: 40
End: 2020-03-16

## 2020-03-16 DIAGNOSIS — Z78.9 USES BIRTH CONTROL: ICD-10-CM

## 2020-03-16 RX ORDER — NORETHINDRONE ACETATE AND ETHINYL ESTRADIOL AND FERROUS FUMARATE 1MG-20(24)
1 KIT ORAL DAILY
Qty: 84 TABLET | Refills: 0 | Status: SHIPPED | OUTPATIENT
Start: 2020-03-16 | End: 2020-05-08

## 2020-03-16 NOTE — TELEPHONE ENCOUNTER
After hunting around  lAanna Higuera has found a [facility] [place] that [facility] the [facility] and can do a 3 month refill    Can you please [facility] to the [facility] [place] (I've added it to the list of pharmacies)

## 2020-03-30 ENCOUNTER — TELEMEDICINE (OUTPATIENT)
Dept: FAMILY MEDICINE CLINIC | Facility: CLINIC | Age: 40
End: 2020-03-30
Payer: COMMERCIAL

## 2020-03-30 DIAGNOSIS — S61.212A LACERATION OF RIGHT MIDDLE FINGER WITHOUT FOREIGN BODY WITHOUT DAMAGE TO NAIL, INITIAL ENCOUNTER: Primary | ICD-10-CM

## 2020-03-30 PROCEDURE — 99213 OFFICE O/P EST LOW 20 MIN: CPT | Performed by: PHYSICIAN ASSISTANT

## 2020-03-30 RX ORDER — CEPHALEXIN 500 MG/1
1000 CAPSULE ORAL EVERY 12 HOURS SCHEDULED
Qty: 40 CAPSULE | Refills: 0 | Status: SHIPPED | OUTPATIENT
Start: 2020-03-30 | End: 2020-04-09

## 2020-03-30 RX ORDER — DIPHENOXYLATE HYDROCHLORIDE AND ATROPINE SULFATE 2.5; .025 MG/1; MG/1
1 TABLET ORAL DAILY
COMMUNITY

## 2020-03-30 NOTE — PROGRESS NOTES
Virtual Regular Visit     Reason for visit is laceration to right middle finger    Encounter provider Fabricio Cavazos PA-C    Provider located at [address] [place] [address] [address] [facility]  [phone]      Recent Visits  No visits were found meeting these conditions  Showing recent visits within past 7 days and meeting all other requirements     Today's Visits  Date Type Provider Dept   03/30/20 Telemedicine Fabricio Cavazos PA-C [address] [facility]   Showing today's visits and meeting all other requirements     Future Appointments  Date Type Provider Dept   03/30/20 Telemedicine Fabricio Cavazos PA-C [address] [facility]   Showing future appointments within next 150 days and meeting all other requirements        The patient was identified by name and date of birth  Srini Rosa was informed that this is a telemedicine visit and that the visit is being conducted through [employer] and patient was informed that this is not a secure, HIPAA-complaint platform  she agrees to proceed     My office door was closed  No one else was in the room  She acknowledged consent and understanding of privacy and security of the video platform  The patient has agreed to participate and understands they can discontinue the visit at any time  Patient is aware this is a billable service  Subjective  Srini Rosa is a 46 y o  female was cutting cabbage on her brand new mandolin on Saturday night  Sliced her finger  Cleaned it with soap and water, cleaned with hydrogen peroxide  Was able to get it bleeding to stop  Cleaned again today and bandaged again  Afraid of infection  Denies redness, discharge, swelling  Denies fever  History reviewed  No pertinent past medical history  History reviewed  No pertinent surgical history      Current Outpatient Medications   Medication Sig Dispense Refill    ibuprofen (MOTRIN) 600 mg tablet Take 600 mg by mouth every 6 (six) hours as needed      MELODETTA 24 FE 1-20 MG-MCG(24) CHEW Chew 1 tablet daily 84 tablet 0    multivitamin (THERAGRAN) TABS Take 1 tablet by mouth daily       No current facility-administered medications for this visit  No Known Allergies    Review of Systems   Constitutional: Negative  Respiratory: Negative  Cardiovascular: Negative  Skin: Positive for wound  Negative for color change, pallor and rash  Neurological: Negative  Psychiatric/Behavioral: Negative  Physical Exam   Constitutional: She appears well-developed and well-nourished  Skin:   Right middle finger palmar surface with avulsion type laceration about 5 mm x 4 mm, clean, no erythema, discharge or swelling  Assessment/Plan:    1  Laceration finger - discussed wound care, keep covered with vaseline applied to promote healing, no more hydrogen peroxide or antibacterial cream just soap and water to clean and vaseline to keep moist  Keep covered at all times  Discussed signs and symptoms of infection and sent a prescription for keflex 500 mg 2 tabs twice daily for 10 days if infection begins  Td was done 2012, next is due 2022  Follow up as needed    I spent 15 minutes with the patient during this visit

## 2020-05-08 DIAGNOSIS — Z78.9 USES BIRTH CONTROL: ICD-10-CM

## 2020-05-08 RX ORDER — NORETHINDRONE ACETATE AND ETHINYL ESTRADIOL AND FERROUS FUMARATE 1MG-20(24)
1 KIT ORAL DAILY
Qty: 84 TABLET | Refills: 3 | Status: SHIPPED | OUTPATIENT
Start: 2020-05-08 | End: 2021-03-08 | Stop reason: SDUPTHER

## 2020-05-26 ENCOUNTER — TELEPHONE (OUTPATIENT)
Dept: FAMILY MEDICINE CLINIC | Facility: CLINIC | Age: 40
End: 2020-05-26

## 2020-10-21 ENCOUNTER — OFFICE VISIT (OUTPATIENT)
Dept: FAMILY MEDICINE CLINIC | Facility: CLINIC | Age: 40
End: 2020-10-21
Payer: COMMERCIAL

## 2020-10-21 VITALS
BODY MASS INDEX: 34.45 KG/M2 | TEMPERATURE: 96.6 F | SYSTOLIC BLOOD PRESSURE: 120 MMHG | WEIGHT: 194.4 LBS | HEIGHT: 63 IN | RESPIRATION RATE: 16 BRPM | DIASTOLIC BLOOD PRESSURE: 78 MMHG | HEART RATE: 84 BPM

## 2020-10-21 DIAGNOSIS — M70.21 OLECRANON BURSITIS OF RIGHT ELBOW: Primary | ICD-10-CM

## 2020-10-21 PROCEDURE — 99213 OFFICE O/P EST LOW 20 MIN: CPT | Performed by: FAMILY MEDICINE

## 2020-10-21 RX ORDER — CEPHALEXIN 500 MG/1
500 CAPSULE ORAL
Qty: 40 CAPSULE | Refills: 0 | Status: SHIPPED | OUTPATIENT
Start: 2020-10-21 | End: 2020-10-31

## 2021-02-09 ENCOUNTER — VBI (OUTPATIENT)
Dept: ADMINISTRATIVE | Facility: OTHER | Age: 41
End: 2021-02-09

## 2021-03-08 ENCOUNTER — TELEPHONE (OUTPATIENT)
Dept: FAMILY MEDICINE CLINIC | Facility: CLINIC | Age: 41
End: 2021-03-08

## 2021-03-08 DIAGNOSIS — Z78.9 USES BIRTH CONTROL: ICD-10-CM

## 2021-03-08 RX ORDER — NORETHINDRONE ACETATE AND ETHINYL ESTRADIOL AND FERROUS FUMARATE 1MG-20(24)
1 KIT ORAL DAILY
Qty: 84 TABLET | Refills: 0 | Status: SHIPPED | OUTPATIENT
Start: 2021-03-08 | End: 2021-03-30 | Stop reason: SDUPTHER

## 2021-03-08 NOTE — TELEPHONE ENCOUNTER
Patient calling for her MELODETTO birth control pills to [facility]      Patient scheduled an appointment for her pap/physical with you on 3/30/2021     [phone]

## 2021-03-30 ENCOUNTER — ANNUAL EXAM (OUTPATIENT)
Dept: FAMILY MEDICINE CLINIC | Facility: CLINIC | Age: 41
End: 2021-03-30
Payer: COMMERCIAL

## 2021-03-30 VITALS
SYSTOLIC BLOOD PRESSURE: 126 MMHG | WEIGHT: 199.4 LBS | HEART RATE: 90 BPM | TEMPERATURE: 98.3 F | BODY MASS INDEX: 35.33 KG/M2 | HEIGHT: 63 IN | RESPIRATION RATE: 16 BRPM | DIASTOLIC BLOOD PRESSURE: 60 MMHG

## 2021-03-30 DIAGNOSIS — R69 TAKING DRUG FOR CHRONIC DISEASE: ICD-10-CM

## 2021-03-30 DIAGNOSIS — Z01.419 ENCOUNTER FOR GYNECOLOGICAL EXAMINATION WITH PAPANICOLAOU SMEAR OF CERVIX: Primary | ICD-10-CM

## 2021-03-30 DIAGNOSIS — R73.9 HYPERGLYCEMIA: ICD-10-CM

## 2021-03-30 DIAGNOSIS — E78.2 MIXED HYPERLIPIDEMIA: ICD-10-CM

## 2021-03-30 DIAGNOSIS — Z78.9 USES BIRTH CONTROL: ICD-10-CM

## 2021-03-30 PROCEDURE — 3008F BODY MASS INDEX DOCD: CPT | Performed by: PHYSICIAN ASSISTANT

## 2021-03-30 PROCEDURE — 3725F SCREEN DEPRESSION PERFORMED: CPT | Performed by: PHYSICIAN ASSISTANT

## 2021-03-30 PROCEDURE — 99395 PREV VISIT EST AGE 18-39: CPT | Performed by: PHYSICIAN ASSISTANT

## 2021-03-30 RX ORDER — NORETHINDRONE ACETATE AND ETHINYL ESTRADIOL AND FERROUS FUMARATE 1MG-20(24)
1 KIT ORAL DAILY
Qty: 84 TABLET | Refills: 3 | Status: SHIPPED | OUTPATIENT
Start: 2021-03-30 | End: 2021-09-02

## 2021-03-30 NOTE — PROGRESS NOTES
ASSESSMENT & PLAN: Loco Jackson is a 45 y o  No obstetric history on file  with normal gynecologic exam     1   Routine well woman exam done today  2    Pap and HPV:Pap with HPV was done today  Current ASCCP Guidelines reviewed  3  The patient is sexually active  She accepted contraception and options have been discussed  5  The following were reviewed in today's visit: breast self exam, use and side effects of OCPs, adequate intake of calcium and vitamin D, exercise and healthy diet  6  Labs ordered, healthy lifestyle encouraged  7  Patient to return to office in 12 months for physical      All questions have been answered to her satisfaction  CC:  Annual Gynecologic Examination    HPI: Loco Jackson is a 45 y o  No obstetric history on file  who presents for annual gynecologic examination  She has the following concerns:  none    Health Maintenance:    She exercises zero days per week  She wears her seatbelt routinely  She does perform regular monthly self breast exams  She feels safe at home  Patients does follow a balanced diet  History reviewed  No pertinent past medical history  History reviewed  No pertinent surgical history  Past OB/Gyn History:   Patient's last menstrual period was 03/12/2021  History of sexually transmitted infection No  Patient is currently sexually active: heterosexual and  monogamous  years   Birth control:oral contraceptives (estrogen/progesterone)    Last Pap  2016 :  no abnormalities;  HPV negative    Family History  Family History   Problem Relation Age of Onset    No Known Problems Mother     Diabetes Father     Breast cancer Paternal Grandmother     Thyroid disease Paternal Grandfather     Breast cancer Maternal Aunt     Thyroid disease Paternal Uncle     Mental illness Neg Hx         mother father    Substance Abuse Neg Hx         mother father    Alcohol abuse Neg Hx        Social History:  Social History     Socioeconomic History  Marital status: Single     Spouse name: Not on file    Number of children: Not on file    Years of education: Not on file    Highest education level: Not on file   Occupational History    Not on file   Social Needs    Financial resource strain: Not on file    Food insecurity     Worry: Not on file     Inability: Not on file    Transportation needs     Medical: Not on file     Non-medical: Not on file   Tobacco Use    Smoking status: Current Every Day Smoker     Packs/day: 0 50    Smokeless tobacco: Never Used    Tobacco comment: last assessed 8/8/17   Substance and Sexual Activity    Alcohol use: Yes     Comment: 2 beers a week    Drug use: No    Sexual activity: Not on file   Lifestyle    Physical activity     Days per week: Not on file     Minutes per session: Not on file    Stress: Not on file   Relationships    Social connections     Talks on phone: Not on file     Gets together: Not on file     Attends Yazdanism service: Not on file     Active member of club or organization: Not on file     Attends meetings of clubs or organizations: Not on file     Relationship status: Not on file    Intimate partner violence     Fear of current or ex partner: Not on file     Emotionally abused: Not on file     Physically abused: Not on file     Forced sexual activity: Not on file   Other Topics Concern    Not on file   Social History Narrative    Not on file     Presently lives with no one  Patient is same sex partner  Patient is currently employed  Allergies:  No Known Allergies    Medications:    Current Outpatient Medications:     ibuprofen (MOTRIN) 600 mg tablet, Take 600 mg by mouth every 6 (six) hours as needed, Disp: , Rfl:     Melodetta 24 Fe 1-20 MG-MCG(24) CHEW, Chew 1 tablet daily, Disp: 84 tablet, Rfl: 0    multivitamin (THERAGRAN) TABS, Take 1 tablet by mouth daily, Disp: , Rfl:     Review of Systems:  Review of Systems   Constitutional: Negative  HENT: Negative      Eyes: Negative  Respiratory: Negative  Cardiovascular: Negative  Gastrointestinal: Negative  Endocrine: Negative  Genitourinary: Negative  Musculoskeletal: Negative  Skin: Negative  Allergic/Immunologic: Negative  Neurological: Negative  Hematological: Negative  Psychiatric/Behavioral: Negative  Physical Exam:  /60 (BP Location: Left arm, Patient Position: Sitting, Cuff Size: Large)   Pulse 90   Temp 98 3 °F (36 8 °C) (Oral)   Resp 16   Ht 5' 2 75" (1 594 m)   Wt 90 4 kg (199 lb 6 4 oz)   LMP 03/12/2021   Breastfeeding No   BMI 35 60 kg/m²    Physical Exam  Constitutional:       Appearance: Normal appearance  She is well-developed  She is obese  Genitourinary:      Vulva, vagina and uterus normal       No inguinal adenopathy present in the right or left side  No lesions in the vagina  No vaginal discharge, erythema or bleeding  No cervical motion tenderness, discharge, lesion or polyp  Uterus is not enlarged or tender  No uterine mass detected  No right or left adnexal mass present  Right adnexa not tender or full  Left adnexa not tender or full  HENT:      Head: Normocephalic and atraumatic  Eyes:      Conjunctiva/sclera: Conjunctivae normal       Pupils: Pupils are equal, round, and reactive to light  Neck:      Musculoskeletal: Normal range of motion and neck supple  Thyroid: No thyromegaly  Cardiovascular:      Rate and Rhythm: Normal rate and regular rhythm  Pulses: Normal pulses  Heart sounds: Normal heart sounds  No murmur  Pulmonary:      Effort: Pulmonary effort is normal  No respiratory distress  Breath sounds: Normal breath sounds  No wheezing or rales  Chest:      Breasts:         Right: No inverted nipple, mass, nipple discharge or skin change  Left: No inverted nipple, mass, nipple discharge or skin change  Abdominal:      General: Abdomen is flat   Bowel sounds are normal  There is no distension  Palpations: Abdomen is soft  There is no mass  Hernia: No hernia is present  There is no hernia in the left inguinal area  Musculoskeletal: Normal range of motion  Lymphadenopathy:      Cervical: No cervical adenopathy  Upper Body:      Right upper body: No supraclavicular adenopathy  Left upper body: No supraclavicular adenopathy  Lower Body: No right inguinal adenopathy  No left inguinal adenopathy  Neurological:      General: No focal deficit present  Mental Status: She is alert and oriented to person, place, and time  Cranial Nerves: No cranial nerve deficit  Deep Tendon Reflexes: Reflexes are normal and symmetric  Skin:     General: Skin is warm and dry  Psychiatric:         Mood and Affect: Mood normal          Behavior: Behavior normal          Thought Content:  Thought content normal          Judgment: Judgment normal

## 2021-04-02 LAB
CYTOLOGIST CVX/VAG CYTO: NORMAL
DX ICD CODE: NORMAL
OTHER STN SPEC: NORMAL
PATH REPORT.FINAL DX SPEC: NORMAL
SL AMB NOTE:: NORMAL
SL AMB SPECIMEN ADEQUACY: NORMAL

## 2021-04-12 ENCOUNTER — TELEPHONE (OUTPATIENT)
Dept: FAMILY MEDICINE CLINIC | Facility: CLINIC | Age: 41
End: 2021-04-12

## 2021-04-12 NOTE — TELEPHONE ENCOUNTER
Patient is looking for cream for a yeast rash  She said you spoke of this at her last ob appt and is requesting this be prescribed to her local pharmacy      [facility]    [phone]

## 2021-04-13 DIAGNOSIS — B35.4 TINEA CORPORIS: Primary | ICD-10-CM

## 2021-04-13 RX ORDER — CLOTRIMAZOLE AND BETAMETHASONE DIPROPIONATE 10; .64 MG/G; MG/G
CREAM TOPICAL 2 TIMES DAILY
Qty: 30 G | Refills: 0 | Status: SHIPPED | OUTPATIENT
Start: 2021-04-13 | End: 2022-03-03

## 2021-09-02 ENCOUNTER — TELEPHONE (OUTPATIENT)
Dept: FAMILY MEDICINE CLINIC | Facility: CLINIC | Age: 41
End: 2021-09-02

## 2021-09-02 DIAGNOSIS — Z78.9 USES BIRTH CONTROL: ICD-10-CM

## 2021-09-02 RX ORDER — NORETHINDRONE ACETATE AND ETHINYL ESTRADIOL 1MG-20(21)
1 KIT ORAL DAILY
Qty: 84 TABLET | Refills: 3 | Status: SHIPPED | OUTPATIENT
Start: 2021-09-02 | End: 2022-07-18

## 2021-09-02 NOTE — TELEPHONE ENCOUNTER
Pt said that she had messaged you regarding her Melodetta script  It has apparently been discontinued and she needs to have a new script for a substitute  Can you send something in or do you need to talk to her? She is almost out at this point

## 2021-09-02 NOTE — TELEPHONE ENCOUNTER
She just said that she is on it to regulate hormones, too not just birth control and she wanted to make sure it would work the same  Other than that, no preference

## 2021-09-02 NOTE — TELEPHONE ENCOUNTER
I never got a message, does she have a preference? She has always been on the chewable, possible the not chewable version is available? Would that be ok?

## 2022-03-03 ENCOUNTER — PROCEDURE VISIT (OUTPATIENT)
Dept: FAMILY MEDICINE CLINIC | Facility: CLINIC | Age: 42
End: 2022-03-03
Payer: COMMERCIAL

## 2022-03-03 VITALS
BODY MASS INDEX: 35.86 KG/M2 | RESPIRATION RATE: 16 BRPM | HEIGHT: 63 IN | SYSTOLIC BLOOD PRESSURE: 130 MMHG | HEART RATE: 86 BPM | DIASTOLIC BLOOD PRESSURE: 80 MMHG | WEIGHT: 202.4 LBS

## 2022-03-03 DIAGNOSIS — L91.8 SKIN TAG: Primary | ICD-10-CM

## 2022-03-03 DIAGNOSIS — B37.9 CANDIDA INFECTION: ICD-10-CM

## 2022-03-03 DIAGNOSIS — Z12.31 ENCOUNTER FOR SCREENING MAMMOGRAM FOR MALIGNANT NEOPLASM OF BREAST: ICD-10-CM

## 2022-03-03 PROCEDURE — 3008F BODY MASS INDEX DOCD: CPT | Performed by: PHYSICIAN ASSISTANT

## 2022-03-03 PROCEDURE — 99213 OFFICE O/P EST LOW 20 MIN: CPT | Performed by: PHYSICIAN ASSISTANT

## 2022-03-03 PROCEDURE — 3725F SCREEN DEPRESSION PERFORMED: CPT | Performed by: PHYSICIAN ASSISTANT

## 2022-03-03 RX ORDER — NYSTATIN 100000 U/G
CREAM TOPICAL 2 TIMES DAILY
Qty: 30 G | Refills: 0 | Status: SHIPPED | OUTPATIENT
Start: 2022-03-03

## 2022-03-03 NOTE — PROGRESS NOTES
Assessment/Plan:    1  Skin tag    - removed without complication, not sent for pathology  Advised to keep clean and dry for 24 hours then clean twice daily applying vaseline and new bandaid    2  Candida infection    - under breast, advised to keep clean and dry, apply nystatin twice daily for 4 weeks  - nystatin (MYCOSTATIN) cream; Apply topically 2 (two) times a day  Dispense: 30 g; Refill: 0    F/u as needed  F/u physical    Subjective:   Chief Complaint   Patient presents with    Skin Problem     removal       Patient ID: Rudy Ball is a 37 y o  female  Patient here to have skin tag removed from left armpit, bothering patient, gets pulled by bra  Also still with rash under breasts  Tried lotrisone with no relief  The following portions of the patient's history were reviewed and updated as appropriate: allergies, current medications, past family history, past medical history, past social history, past surgical history and problem list     History reviewed  No pertinent past medical history  History reviewed  No pertinent surgical history  Family History   Problem Relation Age of Onset    No Known Problems Mother     Diabetes Father     Breast cancer Paternal Grandmother     Thyroid disease Paternal Grandfather     Breast cancer Maternal Aunt     Thyroid disease Paternal Uncle     Mental illness Neg Hx         mother father    Substance Abuse Neg Hx         mother father    Alcohol abuse Neg Hx      Social History     Socioeconomic History    Marital status: Single     Spouse name: Not on file    Number of children: Not on file    Years of education: Not on file    Highest education level: Not on file   Occupational History    Not on file   Tobacco Use    Smoking status: Current Every Day Smoker     Packs/day: 0 50    Smokeless tobacco: Never Used    Tobacco comment: last assessed 8/8/17   Vaping Use    Vaping Use: Never used   Substance and Sexual Activity    Alcohol use:  Yes Comment: 2 beers a week    Drug use: No    Sexual activity: Not on file   Other Topics Concern    Not on file   Social History Narrative    Not on file     Social Determinants of Health     Financial Resource Strain: Not on file   Food Insecurity: Not on file   Transportation Needs: Not on file   Physical Activity: Not on file   Stress: Not on file   Social Connections: Not on file   Intimate Partner Violence: Not on file   Housing Stability: Not on file       Current Outpatient Medications:     clotrimazole-betamethasone (LOTRISONE) 1-0 05 % cream, Apply topically 2 (two) times a day (Patient taking differently: Apply topically as needed  ), Disp: 30 g, Rfl: 0    ibuprofen (MOTRIN) 600 mg tablet, Take 600 mg by mouth every 6 (six) hours as needed, Disp: , Rfl:     multivitamin (THERAGRAN) TABS, Take 1 tablet by mouth daily, Disp: , Rfl:     norethindrone-ethinyl estradiol (JUNEL FE 1/20) 1-20 MG-MCG per tablet, Take 1 tablet by mouth daily, Disp: 84 tablet, Rfl: 3    Review of Systems          Objective:    Vitals:    03/03/22 1133   BP: 130/80   Pulse: 86   Resp: 16   Weight: 91 8 kg (202 lb 6 4 oz)   Height: 5' 2 75" (1 594 m)        Physical Exam  Constitutional:       Appearance: Normal appearance  HENT:      Head: Normocephalic and atraumatic  Cardiovascular:      Rate and Rhythm: Normal rate and regular rhythm  Pulses: Normal pulses  Heart sounds: Normal heart sounds  Pulmonary:      Effort: Pulmonary effort is normal       Breath sounds: Normal breath sounds  Musculoskeletal:      Cervical back: Normal range of motion and neck supple  Skin:     Comments: 5 mm pedunculated flesh colored lesion left axilla    Under and between b/l breasts erythematous macular lesions with sharp edges and satellite lesions   Neurological:      General: No focal deficit present  Mental Status: She is alert and oriented to person, place, and time     Psychiatric:         Mood and Affect: Mood normal          Behavior: Behavior normal          Thought Content: Thought content normal          Judgment: Judgment normal                BMI Counseling: Body mass index is 36 14 kg/m²  The BMI is above normal  Nutrition recommendations include reducing portion sizes  Skin tag removal    Date/Time: 3/4/2022 1:06 PM  Performed by: Aquiles Norris PA-C  Authorized by: Aquiles Norris PA-C   Universal Protocol:  Consent: Verbal consent obtained  Written consent obtained  Risks and benefits: risks, benefits and alternatives were discussed  Consent given by: patient  Patient understanding: patient states understanding of the procedure being performed  Patient consent: the patient's understanding of the procedure matches consent given  Patient identity confirmed: verbally with patient        Procedure Details - Skin Tag Destruction:     Up to 15      Body area:  Upper extremity    Upper extremity location:  L upper arm    Malignancy: benign lesion    Lesion 6:      Left axilla with flesh colored skin tag 5 mm in length, 2 mm at base  Area cleaned, injected with 1 cc xylocaine with epi, lesion shaved off and cauterized  Bandage applied  Patient tolerated procedure without complaints

## 2022-03-04 PROCEDURE — 11200 RMVL SKIN TAGS UP TO&INC 15: CPT | Performed by: PHYSICIAN ASSISTANT

## 2023-01-30 DIAGNOSIS — Z78.9 USES BIRTH CONTROL: ICD-10-CM

## 2023-01-30 RX ORDER — NORETHINDRONE ACETATE AND ETHINYL ESTRADIOL 1MG-20(21)
1 KIT ORAL DAILY
Qty: 84 TABLET | Refills: 0 | Status: SHIPPED | OUTPATIENT
Start: 2023-01-30

## 2023-04-25 DIAGNOSIS — Z78.9 USES BIRTH CONTROL: ICD-10-CM

## 2023-04-27 RX ORDER — NORETHINDRONE ACETATE AND ETHINYL ESTRADIOL AND FERROUS FUMARATE 1MG-20(21)
KIT ORAL
Qty: 84 TABLET | Refills: 0 | Status: SHIPPED | OUTPATIENT
Start: 2023-04-27

## 2023-04-27 NOTE — TELEPHONE ENCOUNTER
Cass Pryor called to set up an appointment for the birth control  She was wondering sine she booked the appointment if Jian Valenzuela could refill the medication  Or if she has to wait until the appointment

## 2023-05-18 ENCOUNTER — OFFICE VISIT (OUTPATIENT)
Dept: FAMILY MEDICINE CLINIC | Facility: CLINIC | Age: 43
End: 2023-05-18

## 2023-05-18 VITALS
BODY MASS INDEX: 35.34 KG/M2 | DIASTOLIC BLOOD PRESSURE: 90 MMHG | SYSTOLIC BLOOD PRESSURE: 130 MMHG | HEART RATE: 89 BPM | HEIGHT: 64 IN | OXYGEN SATURATION: 99 % | WEIGHT: 207 LBS | RESPIRATION RATE: 16 BRPM

## 2023-05-18 DIAGNOSIS — Z01.419 GYNECOLOGIC EXAM NORMAL: Primary | ICD-10-CM

## 2023-05-18 DIAGNOSIS — Z23 NEED FOR VACCINATION: ICD-10-CM

## 2023-05-18 DIAGNOSIS — Z12.31 ENCOUNTER FOR SCREENING MAMMOGRAM FOR MALIGNANT NEOPLASM OF BREAST: ICD-10-CM

## 2023-05-18 DIAGNOSIS — Z78.9 USES BIRTH CONTROL: ICD-10-CM

## 2023-05-18 RX ORDER — NORETHINDRONE ACETATE AND ETHINYL ESTRADIOL 1MG-20(21)
1 KIT ORAL DAILY
Qty: 84 TABLET | Refills: 3 | Status: SHIPPED | OUTPATIENT
Start: 2023-05-18

## 2023-05-18 NOTE — PROGRESS NOTES
ASSESSMENT & PLAN: Krista Foster is a 40 y o  No obstetric history on file  with normal gynecologic exam     1   Routine well woman exam done today  2    Pap and HPV:Pap with HPV was done today  Current ASCCP Guidelines reviewed  3   The patient refused STD testing  Safe sex practices have been discussed  4  The patient is sexually active  She agreeed to contraception and options have been discussed  5  The following were reviewed in today's visit: breast self exam, mammography screening ordered, use and side effects of OCPs, adequate intake of calcium and vitamin D, exercise and healthy diet  6  Patient to return to office in 12 months for none  All questions have been answered to her satisfaction  CC:  Annual Gynecologic Examination    HPI: Krista Foster is a 40 y o  No obstetric history on file  who presents for annual gynecologic examination  She has the following concerns:  none    Health Maintenance:    She exercises active on farm days per week  She wears her seatbelt routinely  She does perform regular monthly self breast exams  She feels safe at home  Patients does follow a balanced diet  History reviewed  No pertinent past medical history  History reviewed  No pertinent surgical history  Past OB/Gyn History:   No LMP recorded        Patient is currently sexually active: heterosexual  Birth control:oral contraceptives (estrogen/progesterone)    Last Pap  3/2021:  no abnormalities;  HPV negative    Family History  Family History   Problem Relation Age of Onset   • No Known Problems Mother    • Diabetes Father    • Breast cancer Paternal Grandmother    • Thyroid disease Paternal Grandfather    • Breast cancer Maternal Aunt    • Thyroid disease Paternal Uncle    • Mental illness Neg Hx         mother father   • Substance Abuse Neg Hx         mother father   • Alcohol abuse Neg Hx        Social History:  Social History     Socioeconomic History   • Marital status: Single Spouse name: Not on file   • Number of children: Not on file   • Years of education: Not on file   • Highest education level: Not on file   Occupational History   • Not on file   Tobacco Use   • Smoking status: Every Day     Packs/day: 0 50     Types: Cigarettes   • Smokeless tobacco: Never   • Tobacco comments:     last assessed 8/8/17   Vaping Use   • Vaping Use: Never used   Substance and Sexual Activity   • Alcohol use: Yes     Comment: 2 beers a week, soc   • Drug use: No   • Sexual activity: Not on file   Other Topics Concern   • Not on file   Social History Narrative   • Not on file     Social Determinants of Health     Financial Resource Strain: Not on file   Food Insecurity: Not on file   Transportation Needs: Not on file   Physical Activity: Not on file   Stress: Not on file   Social Connections: Not on file   Intimate Partner Violence: Not on file   Housing Stability: Not on file     Presently lives alone  Patient is single  Patient is currently employed  Allergies: Allergies   Allergen Reactions   • Other Sneezing       Medications:    Current Outpatient Medications:   •  ibuprofen (MOTRIN) 600 mg tablet, Take 600 mg by mouth every 6 (six) hours as needed, Disp: , Rfl:   •  Junel FE 1/20 1-20 MG-MCG per tablet, TAKE 1 TABLET BY MOUTH EVERY DAY, Disp: 84 tablet, Rfl: 0  •  multivitamin (THERAGRAN) TABS, Take 1 tablet by mouth daily, Disp: , Rfl:   •  nystatin (MYCOSTATIN) cream, Apply topically 2 (two) times a day, Disp: 30 g, Rfl: 0    Review of Systems:  Review of Systems   Constitutional: Negative  HENT: Negative  Eyes: Negative  Respiratory: Negative  Cardiovascular: Negative  Gastrointestinal: Negative  Endocrine: Negative  Genitourinary: Negative  Musculoskeletal: Negative  Skin: Negative  Allergic/Immunologic: Negative  Neurological: Negative  Hematological: Negative  Psychiatric/Behavioral: Negative            Physical Exam:  /90   Pulse 89 "Resp 16   Ht 5' 3 5\" (1 613 m)   Wt 93 9 kg (207 lb)   SpO2 99%   BMI 36 09 kg/m²    Physical Exam  Constitutional:       Appearance: Normal appearance  She is well-developed  Genitourinary:      Vulva normal       No lesions in the vagina  No inguinal adenopathy present in the right or left side  No vaginal discharge, erythema or bleeding  Right Adnexa: not tender, not full and no mass present  Left Adnexa: not tender, not full and no mass present  No cervical motion tenderness, discharge, lesion or polyp  Uterus is not enlarged or tender  No uterine mass detected  Breasts:     Right: Normal  No inverted nipple, mass, nipple discharge or skin change  Left: Normal  No inverted nipple, mass, nipple discharge or skin change  HENT:      Head: Normocephalic and atraumatic  Eyes:      Extraocular Movements: Extraocular movements intact  Conjunctiva/sclera: Conjunctivae normal       Pupils: Pupils are equal, round, and reactive to light  Neck:      Thyroid: No thyromegaly  Cardiovascular:      Rate and Rhythm: Normal rate and regular rhythm  Pulses: Normal pulses  Heart sounds: Normal heart sounds  No murmur heard  Pulmonary:      Effort: Pulmonary effort is normal  No respiratory distress  Breath sounds: Normal breath sounds  No wheezing or rales  Abdominal:      General: Abdomen is flat  Bowel sounds are normal  There is no distension  Palpations: Abdomen is soft  There is no mass  Hernia: No hernia is present  There is no hernia in the left inguinal area or right inguinal area  Musculoskeletal:         General: Normal range of motion  Cervical back: Normal range of motion and neck supple  Lymphadenopathy:      Cervical: No cervical adenopathy  Upper Body:      Right upper body: No supraclavicular adenopathy  Left upper body: No supraclavicular adenopathy  Lower Body: No right inguinal adenopathy   No left " inguinal adenopathy  Neurological:      General: No focal deficit present  Mental Status: She is alert and oriented to person, place, and time  Cranial Nerves: No cranial nerve deficit  Deep Tendon Reflexes: Reflexes are normal and symmetric  Skin:     General: Skin is warm and dry  Psychiatric:         Mood and Affect: Mood normal          Behavior: Behavior normal          Thought Content: Thought content normal          Judgment: Judgment normal        BMI Counseling: Body mass index is 36 09 kg/m²  The BMI is above normal  Nutrition recommendations include reducing portion sizes

## 2023-05-22 LAB
CYTOLOGIST CVX/VAG CYTO: NORMAL
DX ICD CODE: NORMAL
HPV GENOTYPE REFLEX: NORMAL
HPV I/H RISK 4 DNA CVX QL PROBE+SIG AMP: NEGATIVE
OTHER STN SPEC: NORMAL
PATH REPORT.FINAL DX SPEC: NORMAL
SL AMB NOTE:: NORMAL
SL AMB SPECIMEN ADEQUACY: NORMAL
SL AMB TEST METHODOLOGY: NORMAL

## 2024-05-08 ENCOUNTER — TELEPHONE (OUTPATIENT)
Dept: FAMILY MEDICINE CLINIC | Facility: CLINIC | Age: 44
End: 2024-05-08

## 2024-05-08 ENCOUNTER — OFFICE VISIT (OUTPATIENT)
Dept: FAMILY MEDICINE CLINIC | Facility: CLINIC | Age: 44
End: 2024-05-08
Payer: COMMERCIAL

## 2024-05-08 VITALS
TEMPERATURE: 97.6 F | SYSTOLIC BLOOD PRESSURE: 134 MMHG | OXYGEN SATURATION: 96 % | RESPIRATION RATE: 16 BRPM | HEART RATE: 94 BPM | HEIGHT: 63 IN | BODY MASS INDEX: 36.86 KG/M2 | DIASTOLIC BLOOD PRESSURE: 80 MMHG | WEIGHT: 208 LBS

## 2024-05-08 DIAGNOSIS — Z12.31 ENCOUNTER FOR SCREENING MAMMOGRAM FOR MALIGNANT NEOPLASM OF BREAST: ICD-10-CM

## 2024-05-08 DIAGNOSIS — R22.9 MASS OF SKIN: ICD-10-CM

## 2024-05-08 DIAGNOSIS — B37.9 CANDIDA INFECTION: ICD-10-CM

## 2024-05-08 DIAGNOSIS — M54.2 NECK PAIN: Primary | ICD-10-CM

## 2024-05-08 PROCEDURE — 99214 OFFICE O/P EST MOD 30 MIN: CPT | Performed by: PHYSICIAN ASSISTANT

## 2024-05-08 RX ORDER — NYSTATIN 100000 U/G
CREAM TOPICAL 2 TIMES DAILY
Qty: 30 G | Refills: 3 | Status: SHIPPED | OUTPATIENT
Start: 2024-05-08

## 2024-05-08 NOTE — PROGRESS NOTES
Assessment/Plan:    Neck pain - muscular in nature, will refer to PT for stretching and strengthening    2. Mass of right elbow - refer to ortho for removal    3. Candida of skin - continue nystatin for 2 week periods as needed, keep area clean and dry    F/u as needed   F/u physical        Subjective:   Chief Complaint   Patient presents with    Shoulder Pain     Right shoulder pain. Worsened over the last 3 months    Mass     Lump on right elbow      Patient ID: Becki Montero is a 43 y.o. female.    Patient with a lump on right elbow, hurts only when hitting it. Has been there 5-6 years after she fell on it. Has had lump there since.    Also with neck pain into shoulder. Right side of neck into shoulder. 3 months. Hurts more with using arms, using chair saw. Almost feels like arm wants to give out. Tried no OTC. 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 Had to do more this year as father had MI and was limited. Denies trauma.     Requesting refill nystatin as rash returns under breasts and inguinal region in warm weather.     The following portions of the patient's history were reviewed and updated as appropriate: allergies, current medications, past family history, past medical history, past social history, past surgical history, and problem list.    History reviewed. No pertinent past medical history.  History reviewed. No pertinent surgical history.  Family History   Problem Relation Age of Onset    No Known Problems Mother     Diabetes Father     Breast cancer Paternal Grandmother     Thyroid disease Paternal Grandfather     Breast cancer Maternal Aunt     Thyroid disease Paternal Uncle     Mental illness Neg Hx         mother father    Substance Abuse Neg Hx         mother father    Alcohol abuse Neg Hx      Social History     Socioeconomic History    Marital status: Single     Spouse name: Not on file    Number of children: Not on file    Years of education: Not on file     "Highest education level: Not on file   Occupational History    Not on file   Tobacco Use    Smoking status: Every Day     Current packs/day: 0.50     Types: Cigarettes    Smokeless tobacco: Never    Tobacco comments:     last assessed 8/8/17   Vaping Use    Vaping status: Never Used   Substance and Sexual Activity    Alcohol use: Yes     Comment: 2 beers a week, soc    Drug use: No    Sexual activity: Not on file   Other Topics Concern    Not on file   Social History Narrative    Not on file     Social Determinants of Health     Financial Resource Strain: Not on file   Food Insecurity: Not on file   Transportation Needs: Not on file   Physical Activity: Not on file   Stress: Not on file   Social Connections: Not on file   Intimate Partner Violence: Not on file   Housing Stability: Not on file       Current Outpatient Medications:     ibuprofen (MOTRIN) 600 mg tablet, Take 600 mg by mouth every 6 (six) hours as needed, Disp: , Rfl:     multivitamin (THERAGRAN) TABS, Take 1 tablet by mouth daily, Disp: , Rfl:     norethindrone-ethinyl estradiol (Junel FE 1/20) 1-20 MG-MCG per tablet, Take 1 tablet by mouth daily, Disp: 84 tablet, Rfl: 3    nystatin (MYCOSTATIN) cream, Apply topically 2 (two) times a day, Disp: 30 g, Rfl: 0    Review of Systems          Objective:    Vitals:    05/08/24 1345   BP: 134/80   Pulse: 94   Resp: 16   Temp: 97.6 °F (36.4 °C)   TempSrc: Temporal   SpO2: 96%   Weight: 94.3 kg (208 lb)   Height: 5' 2.75\" (1.594 m)        Physical Exam  Constitutional:       Appearance: Normal appearance.   HENT:      Head: Normocephalic and atraumatic.   Musculoskeletal:         General: Normal range of motion.      Comments: Right paracervical muscles tight with spasm, full ROM   Skin:     General: Skin is warm.      Comments: Right elbow with 7 mm x 3 mm papular mass, not tender   Neurological:      General: No focal deficit present.      Mental Status: She is alert and oriented to person, place, and time. "   Psychiatric:         Mood and Affect: Mood normal.         Behavior: Behavior normal.         Thought Content: Thought content normal.         Judgment: Judgment normal.

## 2024-05-15 ENCOUNTER — CONSULT (OUTPATIENT)
Dept: SURGERY | Facility: CLINIC | Age: 44
End: 2024-05-15
Payer: COMMERCIAL

## 2024-05-15 VITALS
BODY MASS INDEX: 37.03 KG/M2 | DIASTOLIC BLOOD PRESSURE: 94 MMHG | OXYGEN SATURATION: 98 % | HEIGHT: 63 IN | HEART RATE: 98 BPM | TEMPERATURE: 98.4 F | WEIGHT: 209 LBS | SYSTOLIC BLOOD PRESSURE: 151 MMHG | RESPIRATION RATE: 18 BRPM

## 2024-05-15 DIAGNOSIS — R22.9 MASS OF SKIN: ICD-10-CM

## 2024-05-15 PROCEDURE — 11402 EXC TR-EXT B9+MARG 1.1-2 CM: CPT | Performed by: SURGERY

## 2024-05-15 PROCEDURE — 99202 OFFICE O/P NEW SF 15 MIN: CPT | Performed by: SURGERY

## 2024-05-15 NOTE — PROGRESS NOTES
Ambulatory Visit  Name: Becki Montero      : 1980      MRN: [id number]  Encounter Provider: Forrest Sigala MD  Encounter Date: 5/15/2024   Encounter department: [facility] GENERAL SURGERY [facility]    Assessment & Plan   1. Mass of skin  -     Ambulatory Referral to General Surgery      History of Present Illness     Becki Montero is a 43 y.o. female who presents a lump on her right elbow.  She states she has fallen on the elbow several years ago but did not have any issue from that.  About 6 months ago she noticed a lump on the elbow.  It is uncomfortable when she leans on it and it is in the way.    Review of Systems   Constitutional:  Negative for chills and fever.   HENT:  Negative for ear pain and sore throat.    Eyes:  Negative for pain and visual disturbance.   Respiratory:  Negative for cough and shortness of breath.    Cardiovascular:  Negative for chest pain and palpitations.   Gastrointestinal:  Negative for abdominal pain and vomiting.   Musculoskeletal:  Negative for arthralgias and back pain.   Skin:  Negative for color change and rash.   Neurological:  Negative for seizures and syncope.   All other systems reviewed and are negative.      Past Medical History   History reviewed. No pertinent past medical history.  History reviewed. No pertinent surgical history.  Family History   Problem Relation Age of Onset    No Known Problems Mother     Stroke Father     Hypertension Father     Coronary artery disease Father     Hyperlipidemia Father     Heart disease Father     Diabetes Father     Breast cancer Paternal Grandmother     Thyroid disease Paternal Grandfather     Breast cancer Maternal Aunt     Thyroid disease Paternal Uncle     Mental illness Neg Hx         mother father    Substance Abuse Neg Hx         mother father    Alcohol abuse Neg Hx      Current Outpatient Medications on File Prior to Visit   Medication Sig Dispense Refill    ibuprofen (MOTRIN) 600 mg tablet Take 600 mg by  "mouth every 6 (six) hours as needed      multivitamin (THERAGRAN) TABS Take 1 tablet by mouth daily      norethindrone-ethinyl estradiol (Junel FE 1/20) 1-20 MG-MCG per tablet Take 1 tablet by mouth daily 84 tablet 3    nystatin (MYCOSTATIN) cream Apply topically 2 (two) times a day 30 g 3     No current facility-administered medications on file prior to visit.     Allergies   Allergen Reactions    Other Sneezing      Current Outpatient Medications on File Prior to Visit   Medication Sig Dispense Refill    ibuprofen (MOTRIN) 600 mg tablet Take 600 mg by mouth every 6 (six) hours as needed      multivitamin (THERAGRAN) TABS Take 1 tablet by mouth daily      norethindrone-ethinyl estradiol (Junel FE 1/20) 1-20 MG-MCG per tablet Take 1 tablet by mouth daily 84 tablet 3    nystatin (MYCOSTATIN) cream Apply topically 2 (two) times a day 30 g 3     No current facility-administered medications on file prior to visit.      Social History     Tobacco Use    Smoking status: Every Day     Current packs/day: 0.50     Types: Cigarettes    Smokeless tobacco: Never    Tobacco comments:     last assessed 8/8/17   Vaping Use    Vaping status: Never Used   Substance and Sexual Activity    Alcohol use: Yes     Comment: 2 beers a week, soc    Drug use: No    Sexual activity: Not on file     Objective     /94 (BP Location: Left arm, Patient Position: Sitting, Cuff Size: Standard)   Pulse 98   Temp 98.4 °F (36.9 °C) (Temporal)   Resp 18   Ht 5' 2.75\" (1.594 m)   Wt 94.8 kg (209 lb)   SpO2 98%   BMI 37.32 kg/m²     Physical Exam  Vitals and nursing note reviewed.   Constitutional:       General: She is not in acute distress.     Appearance: She is well-developed.   HENT:      Head: Normocephalic and atraumatic.   Eyes:      Conjunctiva/sclera: Conjunctivae normal.   Cardiovascular:      Rate and Rhythm: Normal rate.   Pulmonary:      Effort: Pulmonary effort is normal.      Breath sounds: Normal breath sounds. "   Musculoskeletal:      Cervical back: Neck supple.   Skin:     General: Skin is warm and dry.      Comments: Right elbow there is approximately 1 cm smooth partially mobile subcutaneous cyst   Neurological:      Mental Status: She is alert.   Psychiatric:         Mood and Affect: Mood normal.       Administrative Statements         Skin excision    Date/Time: 5/15/2024 10:30 AM    Performed by: Forrest Sigala MD  Authorized by: Forrest Sigala MD  Universal Protocol:  Consent: Verbal consent obtained.  Risks and benefits: risks, benefits and alternatives were discussed  Consent given by: patient    Procedure Details - Skin Excision:     Number of Lesions:  1  Lesion 1:     Body area:  Upper extremity    Upper extremity location:  R lower arm    Malignancy: benign lesion        Initial size (mm):  12        Final defect size (mm):  12    Closure complexity: simple       Repair Comments: 1% lidocaine with epinephrine and 0.5% Marcaine was used.  Incision was made with a 15 blade scalpel.  The cystic structure was excised in its entirety.  The very thin wall and therefore it ruptured.  Wound was closed with interrupted 3-0 Prolene sutures

## 2024-05-15 NOTE — ASSESSMENT & PLAN NOTE
The cyst was excised in the office.  The appearance likely of some sort of epidermal inclusion cyst.  Was sent to pathology.  She will follow-up in 2 weeks for suture removal.

## 2024-05-20 ENCOUNTER — EVALUATION (OUTPATIENT)
Dept: PHYSICAL THERAPY | Facility: MEDICAL CENTER | Age: 44
End: 2024-05-20
Payer: COMMERCIAL

## 2024-05-20 DIAGNOSIS — M54.2 NECK PAIN: ICD-10-CM

## 2024-05-20 PROCEDURE — 97162 PT EVAL MOD COMPLEX 30 MIN: CPT | Performed by: PHYSICAL THERAPIST

## 2024-05-20 PROCEDURE — 97140 MANUAL THERAPY 1/> REGIONS: CPT | Performed by: PHYSICAL THERAPIST

## 2024-05-20 NOTE — PROGRESS NOTES
PT Evaluation     Today's date: 2024  Patient name: Becki Montero  : 1980  MRN: [id number]  Referring provider: Court Taylor P*  Dx:   Encounter Diagnosis     ICD-10-CM    1. 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  Patient demonstrates limitations in cervical and thoracic mobility surrounding CTJ however most comparable finding was muscle tension in right upper trapezius and levator scapulae.  Patient will benefit from skilled PT intervention to address her discomfort as well as return her to lifting as her work requires without pain.  2x a week for 4-6 weeks.     Subjective  Patient states that she has been having pain in her neck and her right posterior shoulder for several months.  Patient notes that her symptoms seem to worsen with lifting and heavy repetitive work.  Patient would like to improve her tolerance to lifting and work.      Objective  Red Flags: none  Pain: 3-5/10  Reflexes: 2+/5   Posture: slight forward head  AROM: full motion  PROM: full motion  PAROM: comparable sign with UPA of C6-T3 right.    Palpation: tightness of the right upper trap and LS muscles.  Discomfort with palpation   Special Tests: no neurological symptoms, no UMN signs, no changes in sensation.  All shoulder testing was normal with no recreation of symptoms.    Coordination of Movement/strengthe: full strength   Goals:  - Pt I with initial HEP in 1-2 visits  - lifting without increased symptoms.   - improved stabilizing structure activation and improved feed forward mechanism with distal activation  - Increase Functional Status Measure measured by FOTO by EVA           Precautions: none      Manuals             Thoracic UPA B  Gr. v                                                    Neuro Re-Ed                                                                                                        Ther Ex                                                                                                                     Ther Activity                                       Gait Training                                       Modalities

## 2024-05-30 ENCOUNTER — OFFICE VISIT (OUTPATIENT)
Dept: PHYSICAL THERAPY | Facility: MEDICAL CENTER | Age: 44
End: 2024-05-30
Payer: COMMERCIAL

## 2024-05-30 DIAGNOSIS — M54.2 NECK PAIN: Primary | ICD-10-CM

## 2024-05-30 PROCEDURE — 97110 THERAPEUTIC EXERCISES: CPT | Performed by: PHYSICAL THERAPIST

## 2024-05-30 PROCEDURE — 97140 MANUAL THERAPY 1/> REGIONS: CPT | Performed by: PHYSICAL THERAPIST

## 2024-05-30 NOTE — PROGRESS NOTES
Daily Note     Today's date: 2024  Patient name: Becki Montero  : 1980  MRN: [id number]  Referring provider: Court Taylor P*  Dx:   Encounter Diagnosis     ICD-10-CM    1. Neck pain  M54.2                      Subjective: patient states that she is feeling a bit better.  Notes that most of her discomfort is in the upper trap on the right.       Objective: See treatment diary below      Assessment: Tolerated treatment well. Patient exhibited good technique with therapeutic exercises and would benefit from continued PT.  Patient was progressed with manual therapy and scapular stability.        Plan: Continue per plan of care.      Precautions: none    .Daily Treatment Diary     Manual                                                                                   Exercise Diary              UBE 6            pec stretch [id number]            No monnies 15x2 blue band            Shoulder extension 15x2 blue band            Scapular retractions 15x2 black band            Horizontal abduction 15x2 blue band            Chin tucks 15x2            Thoracic extension over foam roller 5secx3            Foam roller on wall 5 min                                                                                                                                                               Modalities

## 2024-06-03 ENCOUNTER — OFFICE VISIT (OUTPATIENT)
Dept: PHYSICAL THERAPY | Facility: MEDICAL CENTER | Age: 44
End: 2024-06-03
Payer: COMMERCIAL

## 2024-06-03 DIAGNOSIS — M54.2 NECK PAIN: Primary | ICD-10-CM

## 2024-06-03 PROCEDURE — 97110 THERAPEUTIC EXERCISES: CPT | Performed by: PHYSICAL THERAPIST

## 2024-06-03 PROCEDURE — 97140 MANUAL THERAPY 1/> REGIONS: CPT | Performed by: PHYSICAL THERAPIST

## 2024-06-03 NOTE — PROGRESS NOTES
Daily Note     Today's date: 6/3/2024  Patient name: Becki Montero  : 1980  MRN: [id number]  Referring provider: Court Taylor P*  Dx:   Encounter Diagnosis     ICD-10-CM    1. Neck pain  M54.2                      Subjective: patient states that she is feeling better.  Is happy with what she has been given and will continue on her own for now to see how she does.       Objective: See treatment diary below      Assessment: Becki Montero has been compliant with attending PT and home exercise program since initial eval.  Becki  has made improvements in objective data since initial evalulation and has achieved all goals.  Patient reports having returned to their prior level or function.  It was mutually agreed to Discharge to home exercise program at this time.     Precautions: none    .Daily Treatment Diary     Manual              C3-5 UPA right Gr. Iv 10sec x5            STM percussion 10min                                                       Exercise Diary              UBE             pec stretch [id number]            No monnies 15x2 blue band            Shoulder extension 15x2 blue band            Scapular retractions 15x2 black band            Horizontal abduction 15x2 blue band            Chin tucks 15x2            Thoracic extension over foam roller 5secx3            Foam roller on wall 5 min                                                                                                                                                               Modalities

## 2024-06-11 ENCOUNTER — TELEPHONE (OUTPATIENT)
Dept: SURGERY | Facility: CLINIC | Age: 44
End: 2024-06-11

## 2024-06-11 NOTE — TELEPHONE ENCOUNTER
Called patient at 2:45pm and left a voicemail stating that her pathology came back as benign and she may follow-up as needed.

## 2024-06-11 NOTE — TELEPHONE ENCOUNTER
----- Message from Forrest Sigala MD sent at 6/11/2024 12:41 PM EDT -----  The pathology came back as a benign epidermal inclusion cyst.  Can we reach out to her and let her know that it was benign and she can follow-up as needed.

## 2024-06-13 DIAGNOSIS — Z78.9 USES BIRTH CONTROL: ICD-10-CM

## 2024-06-13 RX ORDER — NORETHINDRONE ACETATE AND ETHINYL ESTRADIOL AND FERROUS FUMARATE 1MG-20(21)
1 KIT ORAL DAILY
Qty: 84 TABLET | Refills: 1 | Status: SHIPPED | OUTPATIENT
Start: 2024-06-13

## 2024-12-01 DIAGNOSIS — Z78.9 USES BIRTH CONTROL: ICD-10-CM

## 2024-12-03 RX ORDER — NORETHINDRONE ACETATE AND ETHINYL ESTRADIOL AND FERROUS FUMARATE 1MG-20(21)
1 KIT ORAL DAILY
Qty: 84 TABLET | Refills: 1 | Status: SHIPPED | OUTPATIENT
Start: 2024-12-03

## 2025-05-18 DIAGNOSIS — B37.9 CANDIDA INFECTION: Primary | ICD-10-CM

## 2025-05-18 DIAGNOSIS — Z78.9 USES BIRTH CONTROL: ICD-10-CM

## 2025-05-19 RX ORDER — NORETHINDRONE ACETATE AND ETHINYL ESTRADIOL AND FERROUS FUMARATE 1MG-20(21)
1 KIT ORAL DAILY
Qty: 84 TABLET | Refills: 1 | Status: SHIPPED | OUTPATIENT
Start: 2025-05-19

## 2025-05-19 RX ORDER — CLOTRIMAZOLE AND BETAMETHASONE DIPROPIONATE 10; .64 MG/G; MG/G
CREAM TOPICAL 2 TIMES DAILY
Qty: 30 G | Refills: 0 | Status: SHIPPED | OUTPATIENT
Start: 2025-05-19

## 2025-06-25 ENCOUNTER — OFFICE VISIT (OUTPATIENT)
Dept: FAMILY MEDICINE CLINIC | Facility: CLINIC | Age: 45
End: 2025-06-25
Payer: COMMERCIAL

## 2025-06-25 VITALS
HEIGHT: 63 IN | WEIGHT: 207 LBS | TEMPERATURE: 98 F | HEART RATE: 84 BPM | BODY MASS INDEX: 36.68 KG/M2 | RESPIRATION RATE: 16 BRPM | OXYGEN SATURATION: 98 % | DIASTOLIC BLOOD PRESSURE: 82 MMHG | SYSTOLIC BLOOD PRESSURE: 130 MMHG

## 2025-06-25 DIAGNOSIS — R22.9 MASS OF SKIN: ICD-10-CM

## 2025-06-25 DIAGNOSIS — Z78.9 USES BIRTH CONTROL: ICD-10-CM

## 2025-06-25 DIAGNOSIS — Z12.31 ENCOUNTER FOR SCREENING MAMMOGRAM FOR BREAST CANCER: ICD-10-CM

## 2025-06-25 DIAGNOSIS — Z00.00 ANNUAL PHYSICAL EXAM: Primary | ICD-10-CM

## 2025-06-25 DIAGNOSIS — R73.01 IFG (IMPAIRED FASTING GLUCOSE): ICD-10-CM

## 2025-06-25 PROCEDURE — 99396 PREV VISIT EST AGE 40-64: CPT | Performed by: PHYSICIAN ASSISTANT

## 2025-06-25 RX ORDER — NORETHINDRONE ACETATE AND ETHINYL ESTRADIOL AND FERROUS FUMARATE 1MG-20(21)
1 KIT ORAL DAILY
Qty: 84 TABLET | Refills: 1 | Status: SHIPPED | OUTPATIENT
Start: 2025-06-25

## 2025-06-25 NOTE — PROGRESS NOTES
Adult Annual Physical  Name: Becki Monetro      : 1980      MRN: [id number]  Encounter Provider: Court Taylor PA-C  Encounter Date: 2025   Encounter department: [facility]    :  Assessment & Plan  Annual physical exam    Orders:    Lipid Panel with Direct LDL reflex; Future    Comprehensive metabolic panel; Future    CBC and differential; Future    TSH, 3rd generation with Free T4 reflex; Future    Encounter for screening mammogram for breast cancer    Orders:    Mammo screening bilateral w 3d and cad; Future    IFG (impaired fasting glucose)    Orders:    Hemoglobin A1C    Uses birth control    Orders:    Junel FE 1/20 1-20 MG-MCG per tablet; Take 1 tablet by mouth daily    Mass of skin    Orders:    US head neck soft tissue; Future        Preventive Screenings:  - Diabetes Screening: orders placed  - Cholesterol Screening: orders placed   - Hepatitis C screening: screening up-to-date   - Cervical cancer screening: screening up-to-date   - Breast cancer screening: risks/benefits discussed and orders placed   - Colon cancer screening: patient declines   - Lung cancer screening: screening not indicated     Immunizations:  - Immunizations due: Prevnar 20  - Risks/benefits immunizations discussed      Counseling/Anticipatory Guidance:    - Dental health: discussed importance of regular tooth brushing, flossing, and dental visits.   - Diet: discussed recommendations for a healthy/well-balanced diet.   - Exercise: the importance of regular exercise/physical activity was discussed. Recommend exercise 3-5 times per week for at least 30 minutes.   - Injury prevention: discussed safety/seat belts, safety helmets, smoke detectors, carbon monoxide detectors, and smoking near bedding or upholstery.          F/u as needed or 1 year    History of Present Illness     Adult Annual Physical:  Patient presents for annual physical.     Diet and Physical Activity:  -  "Diet/Nutrition: well balanced diet.  - Exercise: moderate cardiovascular exercise.    Depression Screening:  - PHQ-2 Score: 0    General Health:  - Sleep: sleeps well.    Review of Systems   Constitutional: Negative.    HENT: Negative.     Eyes: Negative.    Respiratory: Negative.     Cardiovascular: Negative.    Gastrointestinal: Negative.    Endocrine: Negative.    Genitourinary: Negative.    Musculoskeletal: Negative.    Skin: Negative.    Allergic/Immunologic: Negative.    Neurological: Negative.    Hematological: Negative.    Psychiatric/Behavioral: Negative.       Medical History Reviewed by provider this encounter:  Allergies  Meds  Problems  Med Hx  Surg Hx     .    Objective   /82   Pulse 84   Temp 98 °F (36.7 °C) (Temporal)   Resp 16   Ht 5' 2.75\" (1.594 m)   Wt 93.9 kg (207 lb)   SpO2 98%   BMI 36.96 kg/m²     Physical Exam  Constitutional:       Appearance: Normal appearance. She is well-developed and normal weight.   HENT:      Head: Normocephalic and atraumatic.      Right Ear: Hearing and external ear normal.      Left Ear: Hearing and external ear normal.      Mouth/Throat:      Pharynx: Uvula midline.     Eyes:      Extraocular Movements: Extraocular movements intact.      Conjunctiva/sclera: Conjunctivae normal.      Pupils: Pupils are equal, round, and reactive to light.     Neck:      Thyroid: No thyromegaly.     Cardiovascular:      Rate and Rhythm: Normal rate and regular rhythm.      Pulses: Normal pulses.      Heart sounds: Normal heart sounds. No murmur heard.  Pulmonary:      Effort: Pulmonary effort is normal.      Breath sounds: Normal breath sounds.   Abdominal:      General: Abdomen is flat. Bowel sounds are normal. There is no distension.      Palpations: Abdomen is soft. There is no mass.      Tenderness: There is no abdominal tenderness.     Musculoskeletal:         General: Normal range of motion.      Cervical back: Normal range of motion and neck supple. "   Lymphadenopathy:      Cervical: No cervical adenopathy.     Skin:     General: Skin is warm.      Comments: Prominence soft tissue C7     Neurological:      General: No focal deficit present.      Mental Status: She is alert and oriented to person, place, and time.      Deep Tendon Reflexes: Reflexes normal.     Psychiatric:         Mood and Affect: Mood normal.         Behavior: Behavior normal.         Thought Content: Thought content normal.         Judgment: Judgment normal.